# Patient Record
Sex: FEMALE | Employment: FULL TIME | URBAN - METROPOLITAN AREA
[De-identification: names, ages, dates, MRNs, and addresses within clinical notes are randomized per-mention and may not be internally consistent; named-entity substitution may affect disease eponyms.]

---

## 2020-03-13 ENCOUNTER — HOSPITAL ENCOUNTER (OUTPATIENT)
Dept: PHYSICAL THERAPY | Age: 50
Discharge: HOME | End: 2020-03-13
Payer: COMMERCIAL

## 2020-03-13 PROCEDURE — 97110 THERAPEUTIC EXERCISES: CPT

## 2020-03-13 PROCEDURE — 97161 PT EVAL LOW COMPLEX 20 MIN: CPT

## 2020-03-13 NOTE — PROGRESS NOTES
[facility] PHYSICAL THERAPY  [address] [place] [address] 51, Anup Spence [address],[facility], [facility] - Phone: [phone]  Fax: [phone] / 7709 [facility]  Patient Name: Gideon Brown : 1970   Medical   Diagnosis: Left knee pain [M25.562] Treatment Diagnosis: Left knee pain [M25.562]   Onset Date: DOS 3/9/2020     Referral Source: Raheem Beckett MD Start of [facility]): 3/13/2020   Prior Hospitalization: See medical history Provider #: [id number]   Prior Level of Function: I with ADL's, no pain with standing walking stairs   Comorbidities: None Listed   Medications: Verified on Patient Summary List   The Plan of Care and following information is based on the information from the initial evaluation.   ==================================================================================  Assessment / key information:  Pt is 52year old female s/p L ACL reconstruction on 3/9/2020. Pt reports sustaining a fall in December causing injury to the L knee. Pt presented today with L knee brace locked at 0 and use of BL axillary crutches. Pt was PWB with good form and setting of the BL axillary crutches. Pt has 2 anterior scope incision with sutures, an anterior knee incision approximately 2 inches covered with steristrips, and a lateral upper leg incision also covered with a steristrip. Incisions appear well healing and no signs of infection. Pt was educated on s/s of infections and verbalized understanding. Pt reports use of CPM machine for 6 hours a day at home. Pt reports compliance with given exercises from MD as well as use of brace at all times, including sleeping. Pt was educated on post-op protocol and precautions. PROM of the L knee 11-90 degrees. Moderate edema of the L lower leg into the dorsum of the L foot. Moderate edema of the L knee and quad. Decreased quad contraction of the L LE.  Special tests and strength not tested. Pt was educated on proper gait pattern with BL axillary crutches as well as all use of CP for reductions in pain and swelling. The patient was instructed in a home exercise program to address the above findings/deficits. Pt will benefit from PT interventions to address the aforementioned deficits and allow pt to return to PLOF.    ==================================================================================  Eval Complexity: History LOW Complexity : Zero comorbidities / personal factors that will impact the outcome / POC;  Examination  HIGH Complexity : 4+ Standardized tests and measures addressing body structure, function, activity limitation and / or participation in recreation ; Presentation MEDIUM Complexity : Evolving with changing characteristics ; Decision Making Other outcome measures clinical judgement  MEDIUM; Overall Complexity LOW   Problem List: pain affecting function, decrease ROM, decrease strength, edema affecting function, impaired gait/ balance, decrease ADL/ functional abilitiies, decrease activity tolerance and decrease flexibility/ joint mobility   Treatment Plan may include any combination of the following: Therapeutic exercise, Therapeutic activities, Neuromuscular re-education, Physical agent/modality, Gait/balance training, Manual therapy, Patient education, Self Care training, Functional mobility training and Stair training  Patient / Family readiness to learn indicated by: asking questions, trying to perform skills and interest  Persons(s) to be included in education: patient (P)   Barriers to Learning/Limitations: None  Measures taken, if barriers to learning:    Patient Goal (s): Improve knee pain, return to PLOF   Patient self reported health status: good  Rehabilitation Potential: good   Short Term Goals: To be accomplished in  6  treatments:  1. Pt to demonstrate independence with HEP to improve L knee AROM and quad strength for ADLs.   2. Pt to demonstrate 0 degrees L knee extension AROM to improve stability with amb. 3. Pt to demonstrate 10 degree or > improvement in L knee flexion AROM to improve mobility for ADLs.  Long Term Goals: To be accomplished in  12  treatments:  1. Pt to demonstrate 10 reps SLR without lag to improve stability with amb. 2. Pt to demonstrate 0 to 130 degrees L knee flexion AROM to improve mobility for ADLs. 3. Pt to report +5 or > on GROC to improve functional mobility for ADLs. 4. Patient will be independent with long term HEP in order to prepare for DC to home. Frequency / Duration:   Patient to be seen  2  times per week for 12  treatments:  Patient / Caregiver education and instruction: exercises  G-Codes (GP): ANDREINA  Therapist Signature: Margaret Strauss PT, DPT   Date: 0/00/0111   Certification Period: NA Time: 7:40 AM   ==================================================================================  I certify that the above Physical Therapy Services are being furnished while the patient is under my care. I agree with the treatment plan and certify that this therapy is necessary. Physician Signature:        Date:       Time:     Please sign and return to [facility] at [facility] [place], [facility]. or you may fax the signed copy to [phone]. Thank you.

## 2020-03-13 NOTE — PROGRESS NOTES
PHYSICAL THERAPY - DAILY TREATMENT NOTE    Patient Name: Edel Brennan        Date: 3/13/2020  : 1970   yes Patient  Verified  Visit #:      12  Insurance: Payor: Ariela Hernandez / Plan: Joseph Henry PPO / Product Type: PPO /      In time: 1000 Out time: 6844   Total Treatment Time: 45     Medicare/BCBS Time Tracking (below)   Total Timed Codes (min):  na 1:1 Treatment Time:  na     TREATMENT AREA =  Left knee pain [M25.562]    SUBJECTIVE  Pain Level (on 0 to 10 scale):  0  / 10   Medication Changes/New allergies or changes in medical history, any new surgeries or procedures?    no  If yes, update Summary List   Subjective Functional Status/Changes:  []  No changes reported     SEE POC         OBJECTIVE    35 min Evaluation     10 min Therapeutic Exercise:  [x]  See flow sheet   Rationale:      increase ROM and increase strength to improve the patients ability to perform functional ADL's     Billed With/As:   [] TE   [] TA   [] Neuro   [] Self Care Patient Education: [x] Review HEP    [] Progressed/Changed HEP based on:   [] positioning   [] body mechanics   [] transfers   [] heat/ice application    [] other:      Other Objective/Functional Measures:    SEE POC     Post Treatment Pain Level (on 0 to 10) scale:   0  / 10     ASSESSMENT  Assessment/Changes in Function:     Evaluation Code Complexity: History LOW Complexity : Zero comorbidities / personal factors that will impact the outcome / POC; Examination HIGH Complexity : 4+ Standardized tests and measures addressing body structure, function, activity limitation and / or participation in recreation ; Presentation MEDIUM Complexity : Evolving with changing characteristics ; Decision Making Other outcome measures clinical judgement  MEDIUM;  Complexity LOW     Justification for Eval Code Complexity:  Patient History : none  Examination SEE ABOVE EXAM  Clinical Presentation: eovlving pain  Clinical Decision Making : clinical judgement           []  See Progress Note/Recertification   Patient will continue to benefit from skilled PT services to modify and progress therapeutic interventions, address functional mobility deficits, address ROM deficits, address strength deficits, analyze and address soft tissue restrictions, analyze and cue movement patterns, analyze and modify body mechanics/ergonomics and assess and modify postural abnormalities to attain remaining goals. Progress toward goals / Updated goals:    SEE POC     PLAN  []  Upgrade activities as tolerated yes Continue plan of care   []  Discharge due to :    [x]  Other: Pt will be seen 2 times per week for 12 sessions.       Therapist: Lilly Palm, PT, DPT    Date: 3/13/2020 Time: 7:40 AM     Future Appointments   Date Time Provider Olga Lidia Marie   3/13/2020 10:00 AM Vibha Schaffer Cea, PT [facility]

## 2020-03-17 ENCOUNTER — HOSPITAL ENCOUNTER (OUTPATIENT)
Dept: PHYSICAL THERAPY | Age: 50
Discharge: HOME | End: 2020-03-17
Payer: COMMERCIAL

## 2020-03-17 PROCEDURE — 97110 THERAPEUTIC EXERCISES: CPT | Performed by: PHYSICAL THERAPIST

## 2020-03-17 PROCEDURE — 97140 MANUAL THERAPY 1/> REGIONS: CPT | Performed by: PHYSICAL THERAPIST

## 2020-03-17 NOTE — PROGRESS NOTES
Rina Irizarry   PHYSICAL THERAPY - DAILY TREATMENT NOTE    Patient Name: Carlos Manuel Metcalf        Date: 3/17/2020  : 1970   yes Patient  Verified  Visit #:     Insurance: Payor: Kelley Anderson / Plan: [place] [employer] PPO / Product Type: PPO /      In time: 1030 Out time: 1120   Total Treatment Time: 50     Medicare/BCBS Time Tracking (below)   Total Timed Codes (min):  na 1:1 Treatment Time:  na     TREATMENT AREA =  Left knee pain [M25.562]    SUBJECTIVE  Pain Level (on 0 to 10 scale):  3  / 10   Medication Changes/New allergies or changes in medical history, any new surgeries or procedures?    no  If yes, update Summary List   Subjective Functional Status/Changes:  []  No changes reported     Every time I stand up with my brace it falls down - I think because my swelling was so bad it fit better and now that it has gone done it wont fit          OBJECTIVE      25 min Therapeutic Exercise:  [x]  See flow sheet   Rationale:      increase ROM and increase strength to improve the patients ability to complete adls     25 min Manual Therapy: Brace adjustment, stm gastroc, quad, pat sup/inf mobs, knee prom per protocol   Rationale:      decrease pain, increase ROM, increase tissue extensibility, decrease edema  and decrease trigger points to improve patient's ability to complete adls       Billed With/As:   [] TE   [] TA   [] Neuro   [] Self Care Patient Education: [x] Review HEP    [] Progressed/Changed HEP based on:   [] positioning   [] body mechanics   [] transfers   [] heat/ice application    [] other:      Other Objective/Functional Measures:    Pt arrived to session wbat in OTC brace vs Inis Siskin brace locked at 0 due to improper fitting, even with straps pulled to end still unable to wear with hinges at appropriate angle, pt was advised to contact MD office and inform for upcoming appointment to have brace rep present to adjust, pt was advise to wear thicker clothing and if unable to wear at appropriate length NWB - demo verbal understanding   Noted edema along medial incision   te as per flow sheet  Unable to perform estim due to pt clothing - will attempt next session      Post Treatment Pain Level (on 0 to 10) scale:   2  / 10     ASSESSMENT  Assessment/Changes in Function:     No increase in pain following initial session      []  See Progress Note/Recertification   Patient will continue to benefit from skilled PT services to modify and progress therapeutic interventions, address functional mobility deficits, address ROM deficits, address strength deficits, analyze and address soft tissue restrictions, analyze and cue movement patterns, analyze and modify body mechanics/ergonomics, assess and modify postural abnormalities and instruct in home and community integration to attain remaining goals.    Progress toward goals / Updated goals:    Compliant with hep     PLAN  []  Upgrade activities as tolerated yes Continue plan of care   []  Discharge due to :    []  Other:      Therapist: Samia Aguilar, PT    Date: 3/17/2020 Time: 2:16 PM     Future Appointments   Date Time Provider Olga Lidia Marie   [facility]2020 [facility]   3/24/2020  4:30 PM Tiny Noel [facility]   3/26/2020  3:00 PM Jaime Landa [facility]   3/31/2020  4:30 PM Tiny Noel [facility]   4/2/2020  4:30 PM Tiny Noel [facility]   4/7/2020  5:00 PM Bailey Owens PT [facility]   4/9/2020  5:00 PM Damaris Martin [facility]   4/14/2020  4:30 PM Damaris Martin [facility]   4/16/2020  4:30 PM Damaris Martin [facility]   4/21/2020  4:30 PM Damaris Martin [facility]   4/23/2020  4:30 PM Michael Keene, PT [facility]

## 2020-03-19 ENCOUNTER — HOSPITAL ENCOUNTER (OUTPATIENT)
Dept: PHYSICAL THERAPY | Age: 50
Discharge: HOME | End: 2020-03-19
Payer: COMMERCIAL

## 2020-03-19 PROCEDURE — 97110 THERAPEUTIC EXERCISES: CPT

## 2020-03-19 PROCEDURE — 97014 ELECTRIC STIMULATION THERAPY: CPT

## 2020-03-19 PROCEDURE — 97140 MANUAL THERAPY 1/> REGIONS: CPT

## 2020-03-19 NOTE — PROGRESS NOTES
[facility] PHYSICAL THERAPY - DAILY TREATMENT NOTE    Patient Name: Micheal Po        Date: 3/19/2020  : 1970   yes Patient  Verified  Visit #:   3   of   12  Insurance: Payor: Ameya Zarco / Plan: Elías Gonzalez PPO / Product Type: PPO /      In time: 11:30 Out time: 12:53   Total Treatment Time: 83     Medicare/[facility] Time Tracking (below)   Total Timed Codes (min):  na 1:1 Treatment Time:  na     TREATMENT AREA =  Left knee pain [M25.562]    SUBJECTIVE  Pain Level (on 0 to 10 scale):  2  / 10   Medication Changes/New allergies or changes in medical history, any new surgeries or procedures?    no  If yes, update Summary List   Subjective Functional Status/Changes:  []  No changes reported     Patient reports not calling MD office about brace fitting, however has been using 2 ace wraps to keep it from falling down her leg. OBJECTIVE    Modalities Rationale:     decrease inflammation, decrease pain and increase muscle contraction/control to improve patient's ability to complete transfers  10 Min [x] Estim, type/location: [facility] to L quad; 10/50                                     []  att     [x]  unatt     []  w/US     [x]  w/ice    []  w/heat    min []  Mechanical Traction: type/lbs                   []  pro   []  sup   []  int   []  cont    []  before manual    []  after manual    min []  Ultrasound, settings/location:      min []  Iontophoresis w/ dexamethasone, location:                                               []  take home patch       []  in clinic    min []  Ice     []  Heat    location/position:     min []  Vasopneumatic Device, press/temp:     min []  Other:    [x] Skin assessment post-treatment (if applicable):    [x]  intact    [x]  redness- no adverse reaction     []redness  adverse reaction:       48 min Therapeutic Exercise:  [x]  See flow sheet   Rationale:      increase ROM and increase strength to improve the patients ability to perform functional ADLs.      25 min Manual Therapy: STM to L gastroc, L quad, L pat sup/inf mobs, L knee PROM per protocol   Rationale:      decrease pain, increase ROM, increase tissue extensibility, decrease edema  and decrease trigger points to improve patient's ability to regain L knee PROM>AAROM>AROM. Billed With/As:   [x] TE   [] TA   [] Neuro   [] Self Care Patient Education: [x] Review HEP    [] Progressed/Changed HEP based on:   [] positioning   [] body mechanics   [] transfers   [] heat/ice application    [] other:      Other Objective/Functional Measures:    Continued to hold weightbearing activities due to poor brace fit. Highly advised pt to contact MD office and inform of brace fit. Patient verbalized understanding. Continued to be NWB with all therex. Post Treatment Pain Level (on 0 to 10) scale:   3  / 10     ASSESSMENT  Assessment/Changes in Function:     Patient noted minimal elevation in pain following PT session, noting more soreness vs sharp pain. []  See Progress Note/Recertification   Patient will continue to benefit from skilled PT services to modify and progress therapeutic interventions, address functional mobility deficits, address ROM deficits, address strength deficits, analyze and address soft tissue restrictions, analyze and cue movement patterns, analyze and modify body mechanics/ergonomics, assess and modify postural abnormalities and instruct in home and community integration to attain remaining goals.    Progress toward goals / Updated goals:    Slow progress towards PROM goals     PLAN  [x]  Upgrade activities as tolerated yes Continue plan of care   []  Discharge due to :    []  Other:      Therapist: JED Roberts    Date: 3/19/2020 Time: 1:59 PM     Future Appointments   Date Time Provider Olga Lidia Marie   3/19/2020 11:30 AM Jamil Yarbrough [facility]   3/24/2020  4:30 PM Ashley Noel [facility]   3/26/2020  3:00 PM Letty Peterson [facility]   3/31/2020  4:30 PM Jamil Yarbrough [facility]   4/2/2020 4:30 PM Hetal Rudolph [facility]   4/7/2020  5:00 PM Katie Rodriguez, PT [facility]   4/9/2020  5:00 PM Elex Comment [facility]   4/14/2020  4:30 PM Elex Comment [facility]   4/16/2020  4:30 PM Elex Comment [facility]   4/21/2020  4:30 PM Elex Comment [facility]   4/23/2020  4:30 PM Jens Keene, PT [facility]

## 2020-03-24 ENCOUNTER — APPOINTMENT (OUTPATIENT)
Dept: PHYSICAL THERAPY | Age: 50
End: 2020-03-24
Payer: COMMERCIAL

## 2020-03-26 ENCOUNTER — APPOINTMENT (OUTPATIENT)
Dept: PHYSICAL THERAPY | Age: 50
End: 2020-03-26
Payer: COMMERCIAL

## 2020-03-31 ENCOUNTER — APPOINTMENT (OUTPATIENT)
Dept: PHYSICAL THERAPY | Age: 50
End: 2020-03-31
Payer: COMMERCIAL

## 2020-04-02 ENCOUNTER — APPOINTMENT (OUTPATIENT)
Dept: PHYSICAL THERAPY | Age: 50
End: 2020-04-02
Payer: COMMERCIAL

## 2020-04-07 ENCOUNTER — APPOINTMENT (OUTPATIENT)
Dept: PHYSICAL THERAPY | Age: 50
End: 2020-04-07
Payer: COMMERCIAL

## 2020-04-09 ENCOUNTER — APPOINTMENT (OUTPATIENT)
Dept: PHYSICAL THERAPY | Age: 50
End: 2020-04-09
Payer: COMMERCIAL

## 2020-04-14 ENCOUNTER — APPOINTMENT (OUTPATIENT)
Dept: PHYSICAL THERAPY | Age: 50
End: 2020-04-14
Payer: COMMERCIAL

## 2020-04-16 ENCOUNTER — APPOINTMENT (OUTPATIENT)
Dept: PHYSICAL THERAPY | Age: 50
End: 2020-04-16
Payer: COMMERCIAL

## 2020-04-21 ENCOUNTER — APPOINTMENT (OUTPATIENT)
Dept: PHYSICAL THERAPY | Age: 50
End: 2020-04-21
Payer: COMMERCIAL

## 2020-04-23 ENCOUNTER — APPOINTMENT (OUTPATIENT)
Dept: PHYSICAL THERAPY | Age: 50
End: 2020-04-23
Payer: COMMERCIAL

## 2020-04-27 ENCOUNTER — HOSPITAL ENCOUNTER (OUTPATIENT)
Dept: PHYSICAL THERAPY | Age: 50
Discharge: HOME | End: 2020-04-27
Payer: COMMERCIAL

## 2020-04-27 PROCEDURE — 97110 THERAPEUTIC EXERCISES: CPT

## 2020-04-27 PROCEDURE — 97140 MANUAL THERAPY 1/> REGIONS: CPT

## 2020-04-27 NOTE — PROGRESS NOTES
[facility] PHYSICAL THERAPY - DAILY TREATMENT NOTE    Patient Name: Ricardo Walter        Date: 2020  : 1970   yes Patient  Verified  Visit #:     Insurance: Payor: Eric Bonner / Plan: [place] [employer] PPO / Product Type: PPO /      In time: 12:00 Out time: 1:09   Total Treatment Time: 69     Medicare/BCBS Time Tracking (below)   Total Timed Codes (min):  na 1:1 Treatment Time:  na     TREATMENT AREA =  Left knee pain [M25.562]    SUBJECTIVE  Pain Level (on 0 to 10 scale): 0  / 10   Medication Changes/New allergies or changes in medical history, any new surgeries or procedures?    no  If yes, update Summary List   Subjective Functional Status/Changes:  []  No changes reported     SEE PN       OBJECTIVE    Modalities Rationale:     decrease inflammation, decrease pain and increase muscle contraction/control to improve patient's ability to complete transfers  10 Min [x] Estim, type/location: [place] to L quad; 10/30; 60 bps                                    []  att     [x]  unatt     []  w/US     [x]  w/ice    []  w/heat    min []  Mechanical Traction: type/lbs                   []  pro   []  sup   []  int   []  cont    []  before manual    []  after manual    min []  Ultrasound, settings/location:      min []  Iontophoresis w/ dexamethasone, location:                                               []  take home patch       []  in clinic    min []  Ice     []  Heat    location/position:     min []  Vasopneumatic Device, press/temp:     min []  Other:    [x] Skin assessment post-treatment (if applicable):    [x]  intact    [x]  redness- no adverse reaction     []redness - adverse reaction:       39 min Therapeutic Exercise:  [x]  See flow sheet   Rationale:      increase ROM and increase strength to improve the patients ability to perform functional ADLs.      20 min Manual Therapy: L knee assessment; STM to L gastroc, L quad, L pat sup/inf mobs, L knee PROM per protocol   Rationale:      decrease pain, increase ROM, increase tissue extensibility, decrease edema  and decrease trigger points to improve patient's ability to regain L knee PROM>AAROM>AROM. Billed With/As:   [x] TE   [] TA   [] Neuro   [] Self Care Patient Education: [x] Review HEP    [] Progressed/Changed HEP based on:   [] positioning   [] body mechanics   [] transfers   [] heat/ice application    [] other:      Other Objective/Functional Measures:    SEE PN     Post Treatment Pain Level (on 0 to 10) scale:   2  / 10     ASSESSMENT  Assessment/Changes in Function:     SEE PN     []  See Progress Note/Recertification   Patient will continue to benefit from skilled PT services to modify and progress therapeutic interventions, address functional mobility deficits, address ROM deficits, address strength deficits, analyze and address soft tissue restrictions, analyze and cue movement patterns, analyze and modify body mechanics/ergonomics, assess and modify postural abnormalities and instruct in home and community integration to attain remaining goals.    Progress toward goals / Updated goals:    SEE PN     PLAN  [x]  Upgrade activities as tolerated yes Continue plan of care   []  Discharge due to :    []  Other:      Therapist: JED Gentile    Date: 4/27/2020 Time: 1:14 PM     Future Appointments   Date Time Provider Olga Lidia Marie   4/27/2020 12:00 PM Melchor, Myna [facility]   4/29/2020 12:00 PM Jenn [facility]   5/4/2020 11:00 AM Melchor, Myna [facility]   5/6/2020 11:00 AM Daniela [facility]

## 2020-04-27 NOTE — PROGRESS NOTES
[facility] PHYSICAL THERAPY  [address] [place] [address] Anjelica Madden [facility] [address],[facility], 70 [facility] - Phone: [phone]  Fax: [phone]  PROGRESS NOTE  Patient Name: Nickolas Smallwood : 1970   Treatment/Medical Diagnosis: Left knee pain [M25.562]   Referral Source: Janelle Birmingham MD     Date of Initial Visit: 20 Attended Visits: 4 Missed Visits: 0     SUMMARY OF TREATMENT  Patient has attended 4 PT sessions, including an initial eval, for L knee pain (s/p ACLR 20). Gap in attendance due to nationwide concerns of COVID-19. PT interventions have included manual therapy, therapeutic exercises, patient education, and HEP. [place] electric stimulation with CP to increase muscle contraction/control. CURRENT STATUS  Patient is currently 7 weeks post op. In the last 2 weeks, pain has ranged between 0-2/10, primarily along medial scar. Brace locked at 0 deg. Current objective findings: R knee P/AROM flex 101 deg/95 deg, ext -3 deg/-5 deg; SLR 10 reps with min extensor lag; decrease patellar mobility in all directions, good healing along incisions    Goal/Measure of Progress Goal Met? 1.  Pt to demonstrate independence with HEP to improve L knee AROM and quad strength for ADL   Status at last Eval: n/a Current Status: HEP established progressing   2. Pt to demonstrate 0 degrees L knee extension AROM to improve stability with amb. Status at last Eval: -11 Current Status: -5 progresisng   3. Pt to demonstrate 10 degree or > improvement in L knee flexion AROM to improve mobility for ADLs. Status at last Eval: NT Current Status: 101 progressing     New Goals to be achieved in __8-12__  treatments:  1. Continue with LTG #1  2. Continue with LTG #2  3.  Continue with LTG #3    RECOMMENDATIONS  Patient would continue to benefit from skilled PT interventions for 2-3x/wk for 4 weeks to improve L knee ROM, flexibility, and strength within MD protocol to perform functional mobility activities. If you have any questions/comments please contact us directly at [phone]. Thank you for allowing us to assist in the care of your patient. Therapist Signature: JED Parkinson Date: 4/27/2020     Time: 1:11 PM   NOTE TO PHYSICIAN:  [address] AND FAX TO   [facility]: [phone]  If you are unable to process this request in 24 hours please contact our office: [phone]    ___ I have read the above report and request that my patient continue as recommended.   ___ I have read the above report and request that my patient continue therapy with the following changes/special instructions:_________________________________________________________   ___ I have read the above report and request that my patient be discharged from therapy.      Physician Signature:        Date:       Time:

## 2020-04-29 ENCOUNTER — HOSPITAL ENCOUNTER (OUTPATIENT)
Dept: PHYSICAL THERAPY | Age: 50
Discharge: HOME | End: 2020-04-29
Payer: COMMERCIAL

## 2020-04-29 PROCEDURE — 97110 THERAPEUTIC EXERCISES: CPT

## 2020-04-29 PROCEDURE — 97140 MANUAL THERAPY 1/> REGIONS: CPT

## 2020-04-29 PROCEDURE — 97014 ELECTRIC STIMULATION THERAPY: CPT

## 2020-04-29 NOTE — PROGRESS NOTES
[facility] PHYSICAL THERAP[facility] - DAILY TREATMENT NOTE    Patient Name: Alberto Poplar        Date: 2020  : 1970   yes Patient  Verified  Visit #:   5     Insurance: Payor: Vena Duane / Plan: [place] [employer] / Product Type: PPO /      In time: 11:58 Out time: 1:02   Total Treatment Time: 64     Medicare/[facility] Time Tracking (below)   Total Timed Codes (min):  na 1:1 Treatment Time:  na     TREATMENT AREA =  Left knee pain [M25.562]    SUBJECTIVE  Pain Level (on 0 to 10 scale): 0  / 10   Medication Changes/New allergies or changes in medical history, any new surgeries or procedures?    no  If yes, update Summary List   Subjective Functional Status/Changes:  []  No changes reported     Patient reports no pain, however has some \"pulling\" and \"discomfort\" along the medial joint line. Also reports doing her updated HEP. OBJECTIVE    Modalities Rationale:     decrease inflammation, decrease pain and increase muscle contraction/control to improve patient's ability to complete transfers  10 Min [x] Estim, type/location: [place] to L RF and VMO; 10/30; 65 bps                                    []  att     [x]  unatt     []  w/US     [x]  w/ice    []  w/heat    min []  Mechanical Traction: type/lbs                   []  pro   []  sup   []  int   []  cont    []  before manual    []  after manual    min []  Ultrasound, settings/location:      min []  Iontophoresis w/ dexamethasone, location:                                               []  take home patch       []  in clinic    min []  Ice     []  Heat    location/position:     min []  Vasopneumatic Device, press/temp:     min []  Other:    [x] Skin assessment post-treatment (if applicable):    [x]  intact    [x]  redness- no adverse reaction     []redness - adverse reaction:       34 min Therapeutic Exercise:  [x]  See flow sheet   Rationale:      increase ROM and increase strength to improve the patients ability to perform functional ADLs.      20 min Manual Therapy: IASTM/DTM to L gastroc, L quad, and L medial hamstrings; L pat sup/inf mobs, L knee PROM per protocol   Rationale:      decrease pain, increase ROM, increase tissue extensibility, decrease edema  and decrease trigger points to improve patient's ability to regain L knee PROM>AAROM>AROM. Billed With/As:   [x] TE   [] TA   [] Neuro   [] Self Care Patient Education: [x] Review HEP    [] Progressed/Changed HEP based on:   [] positioning   [] body mechanics   [] transfers   [] heat/ice application    [] other:      Other Objective/Functional Measures:    Brace unlocked 0-60 deg per MD protocol. Educated and reviewed use of bilateral crutches/WBAT status with brace unlocked. Educated pt on proper gait mechanics (heel toe gait pattern)  Added prone hang and longsit HS stretch to improve L knee extension. Patient very ticklish during manual techniques, however demo improved tolerance with IASTM tool vs skin to skin. L knee PROM following MT: 1 -110 deg  Patient observed with moderate knee valgus during AAROM exercises. Cues to reduce valgus to prevent \"pulling\" or \"discomfort\" along medial joint line. Post Treatment Pain Level (on 0 to 10) scale:   0  / 10     ASSESSMENT  Assessment/Changes in Function:     Patient making slow but steady progress with improving L knee ROM. Note elevation in pain following PT session. []  See Progress Note/Recertification   Patient will continue to benefit from skilled PT services to modify and progress therapeutic interventions, address functional mobility deficits, address ROM deficits, address strength deficits, analyze and address soft tissue restrictions, analyze and cue movement patterns, analyze and modify body mechanics/ergonomics, assess and modify postural abnormalities and instruct in home and community integration to attain remaining goals.    Progress toward goals / Updated goals:    Progressing towards LTGs #2 and #3     PLAN  [x]  Upgrade activities as tolerated yes Continue plan of care   []  Discharge due to :    []  Other:      Therapist: JED Membreno    Date: 4/29/2020 Time: 1:07 PM     Future Appointments   Date Time Provider Olga Lidia Marie   4/29/2020 12:00 PM [facility]   4/30/2020  9:00 AM Bert [facility]   [facility]   [facility]   5/7/2020  9:00 AM Bert [facility]

## 2020-04-30 ENCOUNTER — HOSPITAL ENCOUNTER (OUTPATIENT)
Dept: PHYSICAL THERAPY | Age: 50
Discharge: HOME | End: 2020-04-30
Payer: COMMERCIAL

## 2020-04-30 PROCEDURE — 97140 MANUAL THERAPY 1/> REGIONS: CPT

## 2020-04-30 PROCEDURE — 97110 THERAPEUTIC EXERCISES: CPT

## 2020-04-30 PROCEDURE — 97014 ELECTRIC STIMULATION THERAPY: CPT

## 2020-04-30 NOTE — PROGRESS NOTES
[facility] PHYSICAL THERAPY - DAILY TREATMENT NOTE    Patient Name: Gina Patel        Date: 2020  : 1970   yes Patient  Verified  Visit #:     Insurance: Payor: Shayla Del Real / Plan: [place] [employer] / Product Type: PPO /      In time: 8:58 Out time: 10:04   Total Treatment Time: 66     Medicare/[facility] Time Tracking (below)   Total Timed Codes (min):  na 1:1 Treatment Time:  na     TREATMENT AREA =  Left knee pain [M25.562]    SUBJECTIVE  Pain Level (on 0 to 10 scale): 0 / 10   Medication Changes/New allergies or changes in medical history, any new surgeries or procedures?    no  If yes, update Summary List   Subjective Functional Status/Changes:  []  No changes reported     Patient reports going for a walk yesterday evening with her son. States she was wearing her brace and used the crutches. OBJECTIVE    Modalities Rationale:     decrease inflammation, decrease pain and increase muscle contraction/control to improve patient's ability to complete transfers  10 Min [x] Estim, type/location: [place] to L RF and VMO; 10/30; 65 bps                                    []  att     [x]  unatt     []  w/US     [x]  w/ice    []  w/heat    min []  Mechanical Traction: type/lbs                   []  pro   []  sup   []  int   []  cont    []  before manual    []  after manual    min []  Ultrasound, settings/location:      min []  Iontophoresis w/ dexamethasone, location:                                               []  take home patch       []  in clinic    min []  Ice     []  Heat    location/position:     min []  Vasopneumatic Device, press/temp:     min []  Other:    [x] Skin assessment post-treatment (if applicable):    [x]  intact    [x]  redness- no adverse reaction     []redness - adverse reaction:       36 min Therapeutic Exercise:  [x]  See flow sheet   Rationale:      increase ROM and increase strength to improve the patients ability to perform functional ADLs.      20 min Manual Therapy: IASTM/DTM to L gastroc, L quad, and L medial hamstrings; L pat sup/inf mobs, L knee PROM per protocol   Rationale:      decrease pain, increase ROM, increase tissue extensibility, decrease edema  and decrease trigger points to improve patient's ability to regain L knee PROM>AAROM>AROM. Billed With/As:   [x] TE   [] TA   [] Neuro   [] Self Care Patient Education: [x] Review HEP    [] Progressed/Changed HEP based on:   [] positioning   [] body mechanics   [] transfers   [] heat/ice application    [] other:      Other Objective/Functional Measures:    Initiated session with MT to increase extension followed with extension-based exercises. Performed recumbent bike followed by MT to improve flex. L knee AROM ext pre MT: -5 deg. L knee AROM ext post MT: -3 deg    Demonstrated min extensor lag with SLR. Post Treatment Pain Level (on 0 to 10) scale:   2 / 10     ASSESSMENT  Assessment/Changes in Function:     Patient noted elevation in pain level following PT session (noting more soreness vs sharp pain/red flags), however educated pt this was a normal response due to attending f/u visits 2 days in a row. []  See Progress Note/Recertification   Patient will continue to benefit from skilled PT services to modify and progress therapeutic interventions, address functional mobility deficits, address ROM deficits, address strength deficits, analyze and address soft tissue restrictions, analyze and cue movement patterns, analyze and modify body mechanics/ergonomics, assess and modify postural abnormalities and instruct in home and community integration to attain remaining goals. Progress toward goals / Updated goals:    New Goals to be achieved in __8-12__  treatments:  1. Pt to demonstrate independence with HEP to improve L knee AROM and quad strength for ADLs. Met 04/30  2. Pt to demonstrate 0 degrees L knee extension AROM to improve stability with amb. Progressing 04/30; -3 deg  3.  Pt to demonstrate 10 degree or > improvement in L knee flexion AROM to improve mobility for ADLs.      PLAN  [x]  Upgrade activities as tolerated yes Continue plan of care   []  Discharge due to :    []  Other:      Therapist: JED Yang    Date: 4/30/2020 Time: 10:19 AM     Future Appointments   Date Time Provider Olga Lidia Marie   4/30/2020  9:00 AM Genesis Noel [facility]   5/4/2020 11:00 AM Onur Noel [facility]   5/6/2020 11:00 AM Onur Noel [facility]   5/7/2020  1:00 PM [place] [facility]

## 2020-05-04 ENCOUNTER — HOSPITAL ENCOUNTER (OUTPATIENT)
Dept: PHYSICAL THERAPY | Age: 50
Discharge: HOME | End: 2020-05-04
Payer: COMMERCIAL

## 2020-05-04 PROCEDURE — 97014 ELECTRIC STIMULATION THERAPY: CPT

## 2020-05-04 PROCEDURE — 97110 THERAPEUTIC EXERCISES: CPT

## 2020-05-04 PROCEDURE — 97140 MANUAL THERAPY 1/> REGIONS: CPT

## 2020-05-04 NOTE — PROGRESS NOTES
[facility] PHYSICAL THERAPY - DAILY TREATMENT NOTE    Patient Name: Libra Prasad        Date: 2020  : 1970   yes Patient  Verified  Visit #:     Insurance: Payor: [facility] / Plan: [place] [employer] PPO / Product Type: PPO /      In time: 10:54 Out time: 11:59   Total Treatment Time: 65     Medicare/[facility] Time Tracking (below)   Total Timed Codes (min):  na 1:1 Treatment Time:  na     TREATMENT AREA =  Left knee pain [M25.562]    SUBJECTIVE  Pain Level (on 0 to 10 scale): 1-2 / 10   Medication Changes/New allergies or changes in medical history, any new surgeries or procedures?    no  If yes, update Summary List   Subjective Functional Status/Changes:  []  No changes reported     Pt reports have some pain along the medial joint line. Noticed bruises from manual techniques, stating \"I bruise easily\". Also reports performing quad activation throughout her day as well as performing heel slides when she's in bed. MD F/U appt on  at 1030.         OBJECTIVE    Modalities Rationale:     decrease inflammation, decrease pain and increase muscle contraction/control to improve patient's ability to complete transfers  10 Min [x] Estim, type/location: [place] to L RF and VMO; 10/30; 65 bps                                    []  att     [x]  unatt     []  w/US     [x]  w/ice    []  w/heat    min []  Mechanical Traction: type/lbs                   []  pro   []  sup   []  int   []  cont    []  before manual    []  after manual    min []  Ultrasound, settings/location:      min []  Iontophoresis w/ dexamethasone, location:                                               []  take home patch       []  in clinic    min []  Ice     []  Heat    location/position:     min []  Vasopneumatic Device, press/temp:     min []  Other:    [x] Skin assessment post-treatment (if applicable):    [x]  intact    [x]  redness- no adverse reaction     []redness - adverse reaction:       40 min Therapeutic Exercise:  [x]  See flow sheet Rationale:      increase ROM and increase strength to improve the patients ability to perform functional ADLs. 15 min Manual Therapy: IASTM/DTM to L gastroc, L quad, and L medial hamstrings; L pat sup/inf mobs, L knee PROM per protocol   Rationale:      decrease pain, increase ROM, increase tissue extensibility, decrease edema  and decrease trigger points to improve patient's ability to regain L knee PROM>AAROM>AROM. Billed With/As:   [x] TE   [] TA   [] Neuro   [] Self Care Patient Education: [x] Review HEP    [] Progressed/Changed HEP based on:   [] positioning   [] body mechanics   [] transfers   [] heat/ice application    [] other:      Other Objective/Functional Measures:    L knee extension P/AROM: 0 deg/-1 deg  L knee flex P/AAROM: 120 deg/115 deg  Presented with light bruising along mid VMO and popliteus. No tenderness noted on bruising. Added SLR adduction to improve LE strength. Practiced gait training with 1 crutch and brace to normalize gait mechanics. Post Treatment Pain Level (on 0 to 10) scale:   0 / 10     ASSESSMENT  Assessment/Changes in Function:     Patient making steady progress with ROM and strength indicated by ability to demo full revolution on recumbent without pain and min to no extensor lag with SLR. []  See Progress Note/Recertification   Patient will continue to benefit from skilled PT services to modify and progress therapeutic interventions, address functional mobility deficits, address ROM deficits, address strength deficits, analyze and address soft tissue restrictions, analyze and cue movement patterns, analyze and modify body mechanics/ergonomics, assess and modify postural abnormalities and instruct in home and community integration to attain remaining goals. Progress toward goals / Updated goals:    New Goals to be achieved in __8-12__  treatments:  1. Pt to demonstrate independence with HEP to improve L knee AROM and quad strength for ADLs.  Met 04/30  2. Pt to demonstrate 0 degrees L knee extension AROM to improve stability with amb. Progressing 05/04; -1 deg  3. Pt to demonstrate 10 degree or > improvement in L knee flexion AROM to improve mobility for ADLs.  Progressing 05/04; AAROM 115 deg     PLAN  [x]  Upgrade activities as tolerated yes Continue plan of care   []  Discharge due to :    []  Other:      Therapist: JED Polk    Date: 5/4/2020 Time: 12:05 PM     Future Appointments   Date Time Provider Olga Lidia Marie   5/4/2020 11:00 AM Noel, Louetta Angle [facility]   5/6/2020 11:00 AM NoelNaomi [facility]   5/7/2020  1:00 PM Daisy [facility]   5/11/2020  9:00 AM Noel, Louetta Angle [facility]   5/13/2020 10:00 AM Noel, Louetta Angle [facility]   5/18/2020  9:00 AM Noel, Louetta Angle [facility]   5/20/2020 10:00 AM Noel, Louetta Angle [facility]   5/27/2020  9:00 AM Noel, Louetta Angle [facility]

## 2020-05-06 ENCOUNTER — HOSPITAL ENCOUNTER (OUTPATIENT)
Dept: PHYSICAL THERAPY | Age: 50
Discharge: HOME | End: 2020-05-06
Payer: COMMERCIAL

## 2020-05-06 PROCEDURE — 97110 THERAPEUTIC EXERCISES: CPT

## 2020-05-06 PROCEDURE — 97014 ELECTRIC STIMULATION THERAPY: CPT

## 2020-05-06 PROCEDURE — 97140 MANUAL THERAPY 1/> REGIONS: CPT

## 2020-05-06 NOTE — PROGRESS NOTES
[facility] PHYSICAL THERAPY - DAILY TREATMENT NOTE    Patient Name: Monty Willis        Date: 2020  : 1970   yes Patient  Verified  Visit #:     Insurance: Payor: Lauren Pickett / Plan: [place] [employer] PPO / Product Type: PPO /      In time: 10:58 Out time: 12:04   Total Treatment Time: 66     Medicare/[facility] Time Tracking (below)   Total Timed Codes (min):  na 1:1 Treatment Time:  na     TREATMENT AREA =  Left knee pain [M25.562]    SUBJECTIVE  Pain Level (on 0 to 10 scale): 0 / 10   Medication Changes/New allergies or changes in medical history, any new surgeries or procedures?    no  If yes, update Summary List   Subjective Functional Status/Changes:  []  No changes reported     Patient currently reports no L knee pain. States that she has been practicing walking        OBJECTIVE    Modalities Rationale:     decrease inflammation, decrease pain and increase muscle contraction/control to improve patient's ability to complete transfers  10 Min [x] Estim, type/location: [place] to L RF and VMO; 10/30; 65 bps                                    []  att     [x]  unatt     []  w/US     [x]  w/ice    []  w/heat    min []  Mechanical Traction: type/lbs                   []  pro   []  sup   []  int   []  cont    []  before manual    []  after manual    min []  Ultrasound, settings/location:      min []  Iontophoresis w/ dexamethasone, location:                                               []  take home patch       []  in clinic    min []  Ice     []  Heat    location/position:     min []  Vasopneumatic Device, press/temp:     min []  Other:    [x] Skin assessment post-treatment (if applicable):    [x]  intact    [x]  redness- no adverse reaction     []redness - adverse reaction:       40 min Therapeutic Exercise:  [x]  See flow sheet   Rationale:      increase ROM and increase strength to improve the patients ability to perform functional ADLs.      16 min Manual Therapy: MET correction/shotgun technique for R ant innominate; IASTM/DTM to L gastroc, L quad, and L medial hamstrings; L pat sup/inf mobs, L knee PROM per protocol   Rationale:      decrease pain, increase ROM, increase tissue extensibility, decrease edema  and decrease trigger points to improve patient's ability to regain L knee PROM>AAROM>AROM. Billed With/As:   [x] TE   [] TA   [] Neuro   [] Self Care Patient Education: [x] Review HEP    [] Progressed/Changed HEP based on:   [] positioning   [] body mechanics   [] transfers   [] heat/ice application    [] other:      Other Objective/Functional Measures:    Decrease VMO activation prior to MT   Added 2 lb weight to mid tib with prone hang to improve extension. Able to achieve full PROM extension, -2 deg AROM ext     Added mini squats (0-45 deg) and lynette negotiation to improve LE strength. Post Treatment Pain Level (on 0 to 10) scale:   2-3 / 10     ASSESSMENT  Assessment/Changes in Function:     Patient demonstrated improved VMO activation following MT, however continues to demo knee valgus and heel whip during amb with 1 crutch. []  See Progress Note/Recertification   Patient will continue to benefit from skilled PT services to modify and progress therapeutic interventions, address functional mobility deficits, address ROM deficits, address strength deficits, analyze and address soft tissue restrictions, analyze and cue movement patterns, analyze and modify body mechanics/ergonomics, assess and modify postural abnormalities and instruct in home and community integration to attain remaining goals. Progress toward goals / Updated goals:    New Goals to be achieved in __8-12__  treatments:  1. Pt to demonstrate independence with HEP to improve L knee AROM and quad strength for ADLs. Met 04/30  2. Pt to demonstrate 0 degrees L knee extension AROM to improve stability with amb. Progressing 05/04; -1 deg  3.  Pt to demonstrate 10 degree or > improvement in L knee flexion AROM to improve mobility for ADLs.  Progressing 05/04; AAROM 115 deg     PLAN  [x]  Upgrade activities as tolerated yes Continue plan of care   []  Discharge due to :    []  Other:      Therapist: JED Reagan    Date: 5/6/2020 Time: 12:55 PM     Future Appointments   Date Time Provider Olga Lidia Marie   5/6/2020 11:00 AM Benson Noel [facility]   5/7/2020  1:00 PM Ayaka [facility]   5/11/2020  9:00 AM Lizett Noel [facility]   5/13/2020 10:00 AM [facility]   [facility]2020  [facility]   5/20/2020  1:00 PM Ottoniel Ramirez [facility]   5/27/2020  9:00 AM Bert [facility]

## 2020-05-07 ENCOUNTER — HOSPITAL ENCOUNTER (OUTPATIENT)
Dept: PHYSICAL THERAPY | Age: 50
Discharge: HOME | End: 2020-05-07
Payer: COMMERCIAL

## 2020-05-07 PROCEDURE — 97140 MANUAL THERAPY 1/> REGIONS: CPT

## 2020-05-07 PROCEDURE — 97014 ELECTRIC STIMULATION THERAPY: CPT

## 2020-05-07 PROCEDURE — 97110 THERAPEUTIC EXERCISES: CPT

## 2020-05-07 NOTE — PROGRESS NOTES
[facility] PHYSICAL THERAPY - DAILY TREATMENT NOTE    Patient Name: Warren Bacon        Date: 2020  : 1970   yes Patient  Verified  Visit #:     Insurance: Payor: Eunice Burgess / Plan: [employer] PPO / Product Type: PPO /      In time: 12:55 Out time: 2:09   Total Treatment Time: 74     Medicare/[facility] Time Tracking (below)   Total Timed Codes (min):  n/a 1:1 Treatment Time: n/a     TREATMENT AREA =  Left knee pain [M25.562]    SUBJECTIVE  Pain Level (on 0 to 10 scale):  0  / 10   Medication Changes/New allergies or changes in medical history, any new surgeries or procedures?    no  If yes, update Summary List   Subjective Functional Status/Changes:  []  No changes reported     Patient denies L knee pain today. OBJECTIVE    Modalities Rationale:     decrease inflammation, decrease pain and increase muscle contraction/control to improve patient's ability to complete transfers  10 Min [x] Estim, type/location: [place] to R RF and VMO; 10/30; 65 bps                                    []  att     [x]  unatt     []  w/US     [x]  w/ice    []  w/heat    min []  Mechanical Traction: type/lbs                   []  pro   []  sup   []  int   []  cont    []  before manual    []  after manual    min []  Ultrasound, settings/location:      min []  Iontophoresis w/ dexamethasone, location:                                               []  take home patch       []  in clinic    min []  Ice     []  Heat    location/position:     min []  Vasopneumatic Device, press/temp:     min []  Other:    [x] Skin assessment post-treatment (if applicable):    [x]  intact    [x]  redness- no adverse reaction     []redness - adverse reaction:       51 min Therapeutic Exercise:  [x]  See flow sheet   Rationale:      increase ROM and increase strength to improve the patients ability to perform functional ADLs.      13 min Manual Therapy: STM/DTM to L gastroc, L quad, and L medial hamstrings; L pat sup/inf mobs, L knee PROM per protocol   Rationale:      decrease pain, increase ROM, increase tissue extensibility, decrease edema  and decrease trigger points to improve patient's ability to regain L knee PROM>AAROM>AROM. Billed With/As:   [x] TE   [] TA   [] Neuro   [] Self Care Patient Education: [x] Review HEP    [] Progressed/Changed HEP based on:   [] positioning   [] body mechanics   [x] transfers   [] heat/ice application    [] other:      Other Objective/Functional Measures:    Demonstrates full revolutions on recumbent bike, albeit slowly   Patient navigates hurdles NR in // bars with ROBLES UE support   Presents with moderate L knee valgus motion with 4\" step ups (L LE leading)   L Knee AROM = 0.5 to 116 deg      Post Treatment Pain Level (on 0 to 10) scale:   1 / 10     ASSESSMENT  Assessment/Changes in Function:     Advanced therex and initiated static standing balance within protocol parameters with good tolerance. []  See Progress Note/Recertification   Patient will continue to benefit from skilled PT services to modify and progress therapeutic interventions, address functional mobility deficits, address ROM deficits, address strength deficits, analyze and address soft tissue restrictions, analyze and cue movement patterns, analyze and modify body mechanics/ergonomics, assess and modify postural abnormalities and instruct in home and community integration to attain remaining goals. Progress toward goals / Updated goals:    New Goals to be achieved in __8-12__  treatments:  1. Pt to demonstrate independence with HEP to improve L knee AROM and quad strength for ADLs. Met 04/30  2. Pt to demonstrate 0 degrees L knee extension AROM to improve stability with amb. Progressing 05/07; L knee extension AROM = -0.5 deg  3. Pt to demonstrate 10 degree or > improvement in L knee flexion AROM to improve mobility for ADLs.  Progressing 05/7; AAROM 116 deg     PLAN  [x]  Upgrade activities as tolerated yes Continue plan of care   [] Discharge due to :    []  Other:      Therapist: Aki Dahl    Date: 5/7/2020 Time: 12:55 PM     Future Appointments   Date Time Provider Olga Lidia Marie   5/7/2020  1:00 PM [facility]   5/11/2020  9:00 AM [facility]   5/13/2020 10:00 AM [facility]   5/18/2020  9:00 AM [facility]   5/20/2020  1:00 PM Lizett Ruiz[facility]   5/27/2020  9:00 AM Nikole [facility]

## 2020-05-11 ENCOUNTER — HOSPITAL ENCOUNTER (OUTPATIENT)
Dept: PHYSICAL THERAPY | Age: 50
Discharge: HOME | End: 2020-05-11
Payer: COMMERCIAL

## 2020-05-11 PROCEDURE — 97140 MANUAL THERAPY 1/> REGIONS: CPT

## 2020-05-11 PROCEDURE — 97014 ELECTRIC STIMULATION THERAPY: CPT

## 2020-05-11 PROCEDURE — 97110 THERAPEUTIC EXERCISES: CPT

## 2020-05-11 NOTE — PROGRESS NOTES
[facility] - DAILY TREATMENT NOTE    Patient Name: Mannie Whitley        Date: 2020  : 1970   yes Patient  Verified  Visit #:   10   of   20-24  Insurance: Payor: Tiarra Oiler / Plan: [place] [employer] / Product Type: PPO /      In time: 8:58 Out time: 10:02   Total Treatment Time: 64     Medicare/BC[employer] Time Tracking (below)   Total Timed Codes (min):  na 1:1 Treatment Time:  na     TREATMENT AREA =  Left knee pain [M25.562]    SUBJECTIVE  Pain Level (on 0 to 10 scale): 0 / 10   Medication Changes/New allergies or changes in medical history, any new surgeries or procedures?    no  If yes, update Summary List   Subjective Functional Status/Changes:  []  No changes reported     Patient reports no pain in the L knee, states that she has been using her rolling pin to help with tight muscles and performing HEP. OBJECTIVE    Modalities Rationale:     decrease inflammation, decrease pain and increase muscle contraction/control to improve patient's ability to complete transfers  10 Min [x] Estim, type/location: [place] to L RF and VMO; 10/30; 65 bps                                    []  att     [x]  unatt     []  w/US     [x]  w/ice    []  w/heat    min []  Mechanical Traction: type/lbs                   []  pro   []  sup   []  int   []  cont    []  before manual    []  after manual    min []  Ultrasound, settings/location:      min []  Iontophoresis w/ dexamethasone, location:                                               []  take home patch       []  in clinic    min []  Ice     []  Heat    location/position:     min []  Vasopneumatic Device, press/temp:     min []  Other:    [x] Skin assessment post-treatment (if applicable):    [x]  intact    [x]  redness- no adverse reaction     []redness - adverse reaction:       39 min Therapeutic Exercise:  [x]  See flow sheet   Rationale:      increase ROM and increase strength to improve the patients ability to perform functional ADLs.      15  min Manual Therapy: MET correction/shotgun technique for R ant innominate; IASTM/DTM to L gastroc, L quad, and L medial hamstrings; L pat sup/inf mobs, L knee PROM per protocol   Rationale:      decrease pain, increase ROM, increase tissue extensibility, decrease edema  and decrease trigger points to improve patient's ability to regain L knee PROM>AAROM>AROM. Billed With/As:   [x] TE   [] TA   [] Neuro   [] Self Care Patient Education: [x] Review HEP    [] Progressed/Changed HEP based on:   [] positioning   [] body mechanics   [] transfers   [] heat/ice application    [] other:      Other Objective/Functional Measures:    No extensor lag with SLR. Fair volition of VMO during quad set prior to MT. Able to negotiate small hurdles with nonrecip gait pattern and SPC. Ambulated around clinic with/without SPC and brace unlocked to normalize gait pattern. R knee AROM post MT: 1 to 116 deg      Post Treatment Pain Level (on 0 to 10) scale:   0 / 10     ASSESSMENT  Assessment/Changes in Function:     Patient progressing well with PT interventions - able to demo no hip hike and good clearance with lynette negotiation and near to normal gait pattern during amb around clinic. []  See Progress Note/Recertification   Patient will continue to benefit from skilled PT services to modify and progress therapeutic interventions, address functional mobility deficits, address ROM deficits, address strength deficits, analyze and address soft tissue restrictions, analyze and cue movement patterns, analyze and modify body mechanics/ergonomics, assess and modify postural abnormalities and instruct in home and community integration to attain remaining goals. Progress toward goals / Updated goals:    New Goals to be achieved in __8-12__  treatments:  1. Pt to demonstrate independence with HEP to improve L knee AROM and quad strength for ADLs. Met 04/30  2. Pt to demonstrate 0 degrees L knee extension AROM to improve stability with amb. Progressing 05/04; -1 deg  3. Pt to demonstrate 10 degree or > improvement in L knee flexion AROM to improve mobility for ADLs.  Progressing 05/11; AAROM 116 deg     PLAN  [x]  Upgrade activities as tolerated yes Continue plan of care   []  Discharge due to :    []  Other:      Therapist: JED Moreno    Date: 5/11/2020 Time: 10:24 AM     Future Appointments   Date Time Provider Olga Lidia Marie   5/11/2020  9:00 AM Shan Osborn [facility]   5/13/2020 10:00 AM Marla Noel [facility]   5/18/2020  9:00 AM Marla Noel [facility]   5/20/2020  1:00 PM Juan Soni, LILLIE [facility]   5/27/2020  9:00 AM Marla Noel [facility]

## 2020-05-13 ENCOUNTER — HOSPITAL ENCOUNTER (OUTPATIENT)
Dept: PHYSICAL THERAPY | Age: 50
Discharge: HOME | End: 2020-05-13
Payer: COMMERCIAL

## 2020-05-13 PROCEDURE — 97110 THERAPEUTIC EXERCISES: CPT

## 2020-05-13 PROCEDURE — 97140 MANUAL THERAPY 1/> REGIONS: CPT

## 2020-05-13 PROCEDURE — 97014 ELECTRIC STIMULATION THERAPY: CPT

## 2020-05-13 NOTE — PROGRESS NOTES
[facility] - DAILY TREATMENT NOTE    Patient Name: Elza Gant        Date: 2020  : 1970   yes Patient  Verified  Visit #:     Insurance: Payor: Benitez Mackey / Plan: [place] [employer] [place] / Product Type: PPO /      In time: 9:45 Out time: 11:00   Total Treatment Time: 75     Medicare/[facility] Time Tracking (below)   Total Timed Codes (min):  na 1:1 Treatment Time:  na     TREATMENT AREA =  Left knee pain [M25.562]    SUBJECTIVE  Pain Level (on 0 to 10 scale): 0 / 10   Medication Changes/New allergies or changes in medical history, any new surgeries or procedures?    no  If yes, update Summary List   Subjective Functional Status/Changes:  []  No changes reported     Patient reports having her  massage her hip muscles and using the tennis ball, notices a difference in walking since the massage. OBJECTIVE    Modalities Rationale:     decrease inflammation, decrease pain and increase muscle contraction/control to improve patient's ability to complete transfers  10 Min [x] Estim, type/location: [place] to L RF and VMO; 10/30; 65 bps                                    []  att     [x]  unatt     []  w/US     [x]  w/ice    []  w/heat    min []  Mechanical Traction: type/lbs                   []  pro   []  sup   []  int   []  cont    []  before manual    []  after manual    min []  Ultrasound, settings/location:      min []  Iontophoresis w/ dexamethasone, location:                                               []  take home patch       []  in clinic    min []  Ice     []  Heat    location/position:     min []  Vasopneumatic Device, press/temp:     min []  Other:    [x] Skin assessment post-treatment (if applicable):    [x]  intact    [x]  redness- no adverse reaction     []redness - adverse reaction:       50 min Therapeutic Exercise:  [x]  See flow sheet   Rationale:      increase ROM and increase strength to improve the patients ability to perform functional ADLs.      15 min Manual Therapy: IASTM/DTM to L gastroc, L quad, and L medial hamstrings; L pat sup/inf mobs, L knee PROM per protocol   Rationale:      decrease pain, increase ROM, increase tissue extensibility, decrease edema  and decrease trigger points to improve patient's ability to regain L knee PROM>AAROM>AROM. Billed With/As:   [x] TE   [] TA   [] Neuro   [] Self Care Patient Education: [x] Review HEP    [] Progressed/Changed HEP based on:   [] positioning   [] body mechanics   [] transfers   [] heat/ice application    [] other:      Other Objective/Functional Measures:    Ambulated around clinic without brace and 1 axillary crutch per MD protocol. Progressed therex to improve quadriceps and hamstring strength within MD protocol (see flowsheet). L knee AROM: 0 to 118 deg      Post Treatment Pain Level (on 0 to 10) scale:  0/ 10     ASSESSMENT  Assessment/Changes in Function:     Decrease tightness and improved patellar mobility compared to previous PT sessions. Demonstrated good gait mechanics and confidence with amb without brace. []  See Progress Note/Recertification   Patient will continue to benefit from skilled PT services to modify and progress therapeutic interventions, address functional mobility deficits, address ROM deficits, address strength deficits, analyze and address soft tissue restrictions, analyze and cue movement patterns, analyze and modify body mechanics/ergonomics, assess and modify postural abnormalities and instruct in home and community integration to attain remaining goals. Progress toward goals / Updated goals:    New Goals to be achieved in __8-12__  treatments:  1. Pt to demonstrate independence with HEP to improve L knee AROM and quad strength for ADLs. Met 04/30  2. Pt to demonstrate 0 degrees L knee extension AROM to improve stability with amb. Met 05/13; 0 deg  3. Pt to demonstrate 10 degree or > improvement in L knee flexion AROM to improve mobility for ADLs.  Progressing 05/13 ; AAROM 118 deg     PLAN  [x]  Upgrade activities as tolerated yes Continue plan of care   []  Discharge due to :    []  Other:      Therapist: JED Barr    Date: 5/13/2020 Time: 11:02 AM     Future Appointments   Date Time Provider Olga Lidia Marie   5/13/2020 10:00 AM Ivanna Monroy [facility]   5/18/2020  9:00 AM Kirill Noel [facility]   5/20/2020  1:00 PM Gina Jeronimo, LILLIE [facility]   5/27/2020  9:00 AM Kirill Noel [facility]

## 2020-05-18 ENCOUNTER — HOSPITAL ENCOUNTER (OUTPATIENT)
Dept: PHYSICAL THERAPY | Age: 50
Discharge: HOME | End: 2020-05-18
Payer: COMMERCIAL

## 2020-05-18 PROCEDURE — 97110 THERAPEUTIC EXERCISES: CPT

## 2020-05-18 PROCEDURE — 97140 MANUAL THERAPY 1/> REGIONS: CPT

## 2020-05-18 PROCEDURE — 97014 ELECTRIC STIMULATION THERAPY: CPT

## 2020-05-18 NOTE — PROGRESS NOTES
[facility]ICAL [facility] - DAILY TREATMENT NOTE    Patient Name: Cara Bergeron        Date: 2020  : 1970   yes Patient  Verified  Visit #:     Insurance: Payor: Katharine Lipoma / Plan: [place] [employer] [place] / Product Type: PPO /      In time: 8:58 Out time: 10:28   Total Treatment Time: 90     Medicare/[facility] Time Tracking (below)   Total Timed Codes (min):  na 1:1 Treatment Time:  na     TREATMENT AREA =  Left knee pain [M25.562]    SUBJECTIVE  Pain Level (on 0 to 10 scale): 0 / 10   Medication Changes/New allergies or changes in medical history, any new surgeries or procedures?    no  If yes, update Summary List   Subjective Functional Status/Changes:  []  No changes reported     SEE PN       OBJECTIVE    Modalities Rationale:     decrease inflammation, decrease pain and increase muscle contraction/control to improve patient's ability to complete transfers  10 Min [x] Estim, type/location: [place] to L RF and VMO; 10/30; 65 bps                                    []  att     [x]  unatt     []  w/US     [x]  w/ice    []  w/heat    min []  Mechanical Traction: type/lbs                   []  pro   []  sup   []  int   []  cont    []  before manual    []  after manual    min []  Ultrasound, settings/location:      min []  Iontophoresis w/ dexamethasone, location:                                               []  take home patch       []  in clinic    min []  Ice     []  Heat    location/position:     min []  Vasopneumatic Device, press/temp:     min []  Other:    [x] Skin assessment post-treatment (if applicable):    [x]  intact    [x]  redness- no adverse reaction     []redness - adverse reaction:       55 min Therapeutic Exercise:  [x]  See flow sheet   Rationale:      increase ROM and increase strength to improve the patients ability to perform functional ADLs.      25 min Manual Therapy: L knee assessment; MET correction/shotgun technique for R ant inn; IASTM/DTM to L gastroc, L quad, and L medial hamstrings; L pat sup/inf mobs, L knee PROM per protocol   Rationale:      decrease pain, increase ROM, increase tissue extensibility, decrease edema  and decrease trigger points to improve patient's ability to regain L knee PROM>AAROM>AROM. Billed With/As:   [x] TE   [] TA   [] Neuro   [] Self Care Patient Education: [x] Review HEP    [] Progressed/Changed HEP based on:   [] positioning   [] body mechanics   [] transfers   [] heat/ice application    [] other:      Other Objective/Functional Measures:    SEE PN     Post Treatment Pain Level (on 0 to 10) scale:  0 / 10     ASSESSMENT  Assessment/Changes in Function:     SEE PN     []  See Progress Note/Recertification   Patient will continue to benefit from skilled PT services to modify and progress therapeutic interventions, address functional mobility deficits, address ROM deficits, address strength deficits, analyze and address soft tissue restrictions, analyze and cue movement patterns, analyze and modify body mechanics/ergonomics, assess and modify postural abnormalities and instruct in home and community integration to attain remaining goals.    Progress toward goals / Updated goals:    SEE PN     PLAN  [x]  Upgrade activities as tolerated yes Continue plan of care   []  Discharge due to :    []  Other:      Therapist: JED Gentile    Date: 5/18/2020 Time: 10:39 AM     Future Appointments   Date Time Provider Olga Lidia Marie   5/18/2020  9:00 AM Daniela Kim [facility]   5/20/2020  8:00 AM Woo Hendrickson, PT [facility]   5/27/2020  9:00 AM Melchor, Myna Phoenix [facility]

## 2020-05-18 NOTE — PROGRESS NOTES
[facility] PHYSICAL THERAPY  [address] [place] [address] [address],[facility], [address] [facility] - Phone: [phone]  Fax: [phone]  PROGRESS NOTE  Patient Name: Libra Prasad : 1970   Treatment/Medical Diagnosis: Left knee pain [M25.562]   Referral Source: Vitaly Jaquez MD     Date of Initial Visit: 20 Attended Visits: 12 Missed Visits: 0     SUMMARY OF TREATMENT  Patient has attended 12 PT sessions, including an initial eval, for L knee pain (s/p ACLR 20). Gap in attendance due to nationwide concerns of COVID-19. PT interventions have included manual therapy, therapeutic exercises, patient education, and HEP. [place] electric stimulation with CP to increase muscle contraction/control. CURRENT STATUS  Patient has consistently attended PT 2-3x/wk since last progress note on 20 and has made good progress thus far. In the last 2 weeks, pain has ranged between 0-2/10,. Began ambulating this week without brace and crutches - requires min cues for heel-toe gait pattern. Current objective findings: L knee P/AROM 0/-1 deg to 125/119 deg; step ups on 6 inch step; SLS R 60 seconds L 12 seconds; min increase in mid stance with ambulation; min to mod knee valgus with squats and step ups; decrease VMO activitiy    Goal/Measure of Progress Goal Met? 1.  Pt to demonstrate independence with HEP to improve L knee AROM and quad strength for ADL   Status at last Eval: HEP established Current Status: Independent and compliant met   2. Pt to demonstrate 0 degrees L knee extension AROM to improve stability with amb. Status at last Eval: -11 Current Status: -1 progresisng   3. Pt to demonstrate 10 degree or > improvement in L knee flexion AROM to improve mobility for ADLs. Status at last Eval: 101 Current Status: 119 met     New Goals to be achieved in __8__  treatments:  1.  Patient will be independent and compliant with advanced, long-term HEP in prep for D/C to home.  2. Patient will demonstrate 0-120 deg of L knee AROM to improve mobility for prolonged walking activities. .  3. Patient to perform 10 repetitions of 6 inch step downs without compensatory strategies to improve ease with obstacle negotiation. RECOMMENDATIONS  Patient would continue to benefit from skilled PT interventions for 2x/wk for 8 treatments to improve L knee ROM, flexibility, and strength within MD protocol to perform functional mobility activities. If you have any questions/comments please contact us directly at [phone]. Thank you for allowing us to assist in the care of your patient. Therapist Signature: JED Sellers Date: 5/18/2020    Lisette Oseguera PT Time: 10:38 AM   NOTE TO PHYSICIAN:  PLEASE COMPLETE THE ORDERS BELOW AND FAX TO   [facility] Physical Therapy: [phone]  If you are unable to process this request in 24 hours please contact our office: [phone]    ___ I have read the above report and request that my patient continue as recommended.   ___ I have read the above report and request that my patient continue therapy with the following changes/special instructions:_________________________________________________________   ___ I have read the above report and request that my patient be discharged from therapy.      Physician Signature:        Date:       Time:

## 2020-05-20 ENCOUNTER — HOSPITAL ENCOUNTER (OUTPATIENT)
Dept: PHYSICAL THERAPY | Age: 50
Discharge: HOME | End: 2020-05-20
Payer: COMMERCIAL

## 2020-05-20 ENCOUNTER — HOSPITAL ENCOUNTER (OUTPATIENT)
Dept: PHYSICAL THERAPY | Age: 50
End: 2020-05-20
Payer: COMMERCIAL

## 2020-05-20 PROCEDURE — 97140 MANUAL THERAPY 1/> REGIONS: CPT | Performed by: PHYSICAL THERAPIST

## 2020-05-20 PROCEDURE — 97110 THERAPEUTIC EXERCISES: CPT | Performed by: PHYSICAL THERAPIST

## 2020-05-20 NOTE — PROGRESS NOTES
[facility]   PHYSICAL THERAPY - DAILY TREATMENT NOTE    Patient Name: Nicole Mcqueen        Date: 2020  : 1970   yes Patient  Verified  Visit #:   15   of   24  Insurance: Payor: Siri Hu / Plan: Oliva Crimes PPO / Product Type: PPO /      In time: 800 Out time: 923   Total Treatment Time: 70     Medicare/[employer]BS Time Tracking (below)   Total Timed Codes (min):  na 1:1 Treatment Time:  na     TREATMENT AREA =  Left knee pain [M25.562]    SUBJECTIVE  Pain Level (on 0 to 10 scale):  0  / 10   Medication Changes/New allergies or changes in medical history, any new surgeries or procedures?    no  If yes, update Summary List   Subjective Functional Status/Changes:  []  No changes reported     No c/o everything is feeling like it is moving along           OBJECTIVE  Modalities Rationale:     decrease inflammation, decrease pain and increase tissue extensibility to improve patient's ability to complete adls   min [] Estim, type/location:                                      []  att     []  unatt     []  w/US     []  w/ice    []  w/heat    min []  Mechanical Traction: type/lbs                   []  pro   []  sup   []  int   []  cont    []  before manual    []  after manual    min []  Ultrasound, settings/location:      min []  Iontophoresis w/ dexamethasone, location:                                               []  take home patch       []  in clinic   10 min [x]  Ice     []  Heat    location/position: Supine with bolster     min []  Vasopneumatic Device, press/temp:     min []  Other:    [x] Skin assessment post-treatment (if applicable):    []  intact    []  redness- no adverse reaction     []redness - adverse reaction:        40 min Therapeutic Exercise:  [x]  See flow sheet   Rationale:      increase ROM, increase strength, improve coordination, improve balance and increase proprioception to improve the patients ability to complete adls     20 min Manual Therapy: L knee prom, stm anterior medial knee, dtm quad Rationale:      decrease pain, increase ROM, increase tissue extensibility and decrease trigger points to improve patient's ability to complete adls      Billed With/As:   [] TE   [] TA   [] Neuro   [] Self Care Patient Education: [x] Review HEP    [] Progressed/Changed HEP based on:   [] positioning   [] body mechanics   [] transfers   [] heat/ice application    [] other:      Other Objective/Functional Measures:    Initiated session with MT followed by progression of TE  ttp along pes anserine and medial incision  TE took increased time to complete new exercises to ensure appropriate glute and quad activation     Post Treatment Pain Level (on 0 to 10) scale:   0  / 10     ASSESSMENT  Assessment/Changes in Function:     Reduced glute med and eccentric quad strength contributing to valgus collapse during wb     []  See Progress Note/Recertification   Patient will continue to benefit from skilled PT services to modify and progress therapeutic interventions, address functional mobility deficits, address ROM deficits, address strength deficits, analyze and address soft tissue restrictions, analyze and cue movement patterns, analyze and modify body mechanics/ergonomics, assess and modify postural abnormalities, address imbalance/dizziness and instruct in home and community integration to attain remaining goals.    Progress toward goals / Updated goals:    Progressing into next phase of protocol to address goals - pt to see MD today with first in office f/u since COVID 19 will modify ant POC based on visit     PLAN  []  Upgrade activities as tolerated yes Continue plan of care   []  Discharge due to :    []  Other:      Therapist: Steffen Mclaughlin, PT    Date: 5/20/2020 Time: 8:04 AM     Future Appointments   Date Time Provider lOga Lidia Marie   5/27/2020  9:00 AM [address] [address], [facility]   6/1/2020  9:30 AM Bert [facility]   6/3/2020  9:30 AM Bert [facility]   6/8/2020  9:30 AM Bert Vero Gilliam [facility]   6/10/2020  9:30 AM Vero Noel [facility]   6/15/2020  9:30 AM Vero Noel [facility]   6/17/2020  9:30 AM Ishmael Claude M [facility]

## 2020-05-27 ENCOUNTER — HOSPITAL ENCOUNTER (OUTPATIENT)
Dept: PHYSICAL THERAPY | Age: 50
Discharge: HOME | End: 2020-05-27
Payer: COMMERCIAL

## 2020-05-27 PROCEDURE — 97110 THERAPEUTIC EXERCISES: CPT

## 2020-05-27 PROCEDURE — 97140 MANUAL THERAPY 1/> REGIONS: CPT

## 2020-05-27 NOTE — PROGRESS NOTES
[facility] PHYSICAL THERAPY - DAILY TREATMENT NOTE    Patient Name: Ricardo Walter        Date: 2020  : 1970   yes Patient  Verified  Visit #:   15   of   20-24  Insurance: Payor: Eric Bonner / [place]: [place] / Product Type: PPO /      In time: 8:50 Out time: 10:13   Total Treatment Time: 83     Medicare/[facility] Time Tracking (below)   Total Timed Codes (min):  na 1:1 Treatment Time:  na     TREATMENT AREA =  Left knee pain [M25.562]    SUBJECTIVE  Pain Level (on 0 to 10 scale): 2 / 10   Medication Changes/New allergies or changes in medical history, any new surgeries or procedures?    no  If yes, update Summary List   Subjective Functional Status/Changes:  []  No changes reported     Patient reports being more sore (inside of the knee) today due to doing yard work over the week (ie: repetitive bending to pick weeds/grass), denies lifting or carrying. Also reports seeing MD's PA after LV and per pt, PA was satisfied with progress.        OBJECTIVE    Modalities Rationale:     decrease inflammation, decrease pain and increase muscle contraction/control to improve patient's ability to complete transfers   Min [] Estim, type/location:                                     []  att     []  unatt     []  w/US     []  w/ice    []  w/heat    min []  Mechanical Traction: type/lbs                   []  pro   []  sup   []  int   []  cont    []  before manual    []  after manual    min []  Ultrasound, settings/location:      min []  Iontophoresis w/ dexamethasone, location:                                               []  take home patch       []  in clinic   10 Min [x]  Ice     []  Heat    Location/position: To L knee in supine     min []  Vasopneumatic Device, press/temp:     min []  Other:    [x] Skin assessment post-treatment (if applicable):    [x]  intact    [x]  redness- no adverse reaction     []redness - adverse reaction:       58 min Therapeutic Exercise:  [x]  See flow sheet   Rationale:      increase ROM and increase strength to improve the patients ability to perform functional ADLs. 15 min Manual Therapy: DTM/TPR to L L gastroc, L quad, and L medial hamstrings; L pat sup/inf mobs, L knee PROM per protocol   Rationale:      decrease pain, increase ROM, increase tissue extensibility, decrease edema  and decrease trigger points to improve patient's ability to regain L knee PROM>AAROM>AROM. Billed With/As:   [x] TE   [] TA   [] Neuro   [] Self Care Patient Education: [x] Review HEP    [] Progressed/Changed HEP based on:   [] positioning   [] body mechanics   [] transfers   [] heat/ice application    [] other:      Other Objective/Functional Measures: Added 4 inch lateral step downs, steamboats, and lateral walk (knees bent and straight leg) to improve LE strength and stability. Significant TTP noted along medial gastroc and medial scar. Continues to require cues for heel toe gait pattern during amb around clinic. Post Treatment Pain Level (on 0 to 10) scale:  1-2 / 10     ASSESSMENT  Assessment/Changes in Function:     Advised pt to stretch and perform self CFM to improve tenderness of affected areas. Pt verbalized understanding. Will continue to benefit from skilled PT to improve valgus collapse with increasing glute med and eccentric quad strength. []  See Progress Note/Recertification   Patient will continue to benefit from skilled PT services to modify and progress therapeutic interventions, address functional mobility deficits, address ROM deficits, address strength deficits, analyze and address soft tissue restrictions, analyze and cue movement patterns, analyze and modify body mechanics/ergonomics, assess and modify postural abnormalities and instruct in home and community integration to attain remaining goals. Progress toward goals / Updated goals:    New Goals to be achieved in __8__  treatments:  1.  Patient will be independent and compliant with advanced, long-term HEP in prep for D/C to home. 2. Patient will demonstrate 0-120 deg of L knee AROM to improve mobility for prolonged walking activities. .  3. Patient to perform 10 repetitions of 6 inch step downs without compensatory strategies to improve ease with obstacle negotiation.   progressing 05/27; initiated exercise on 4 inch step     PLAN  [x]  Upgrade activities as tolerated yes Continue plan of care   []  Discharge due to :    []  Other:      Therapist: JED Howell    Date: 5/27/2020 Time: 10:20 AM     Future Appointments   Date Time Provider Olga Lidia Marie   5/27/2020  9:00 AM Noel, [facility]   6/1/2020  9:30 AM Noel, [facility]   6/3/2020  9:30 AM Noel, [facility]   6/8/2020  9:30 AM Noel, [facility]   6/10/2020  9:30 AM Noel, [facility]   6/15/2020  9:30 AM Noel, [facility]   6/17/2020  9:30 AM Etienne Moots [facility]

## 2020-05-29 ENCOUNTER — HOSPITAL ENCOUNTER (OUTPATIENT)
Dept: PHYSICAL THERAPY | Age: 50
Discharge: HOME | End: 2020-05-29
Payer: COMMERCIAL

## 2020-05-29 PROCEDURE — 97140 MANUAL THERAPY 1/> REGIONS: CPT

## 2020-05-29 PROCEDURE — 97110 THERAPEUTIC EXERCISES: CPT

## 2020-05-29 NOTE — PROGRESS NOTES
Babita Billing PHYSICAL THERAPY - DAILY TREATMENT NOTE    Patient Name: Davina Lucio        Date: 2020  : 1970   yes Patient  Verified  Visit #:     Insurance: Payor: Jayde Abraham / Plan: [place] [employer] / Product Type: PPO /       In time: 11:55 Out time: 1:05   Total Treatment Time: 70     Medicare/[facility] Time Tracking (below)   Total Timed Codes (min):  na 1:1 Treatment Time:  na     TREATMENT AREA =  Left knee pain [M25.562]    SUBJECTIVE  Pain Level (on 0 to 10 scale): 0 / 10   Medication Changes/New allergies or changes in medical history, any new surgeries or procedures?    no  If yes, update Summary List   Subjective Functional Status/Changes:  []  No changes reported     Patient reports being a little sore since LV, but no pain. States that she feels confident ascending stairs with recip pattern with handrails, however has been hesitant descending stairs. Continues to descend with nonrecip pattern.         OBJECTIVE    Modalities Rationale:     decrease inflammation, decrease pain and increase muscle contraction/control to improve patient's ability to complete transfers   Min [] Estim, type/location:                                     []  att     []  unatt     []  w/US     []  w/ice    []  w/heat    min []  Mechanical Traction: type/lbs                   []  pro   []  sup   []  int   []  cont    []  before manual    []  after manual    min []  Ultrasound, settings/location:      min []  Iontophoresis w/ dexamethasone, location:                                               []  take home patch       []  in clinic   10 Min [x]  Ice     []  Heat    Location/position: To L knee in supine     min []  Vasopneumatic Device, press/temp:     min []  Other:    [x] Skin assessment post-treatment (if applicable):    [x]  intact    [x]  redness- no adverse reaction     []redness - adverse reaction:       40 min Therapeutic Exercis:  [x]  See flow sheet   Rationale:      increase ROM and increase strength to improve the patients ability to perform functional ADLs. 20 min Manual Therapy: DTM/TPR to L L gastroc, L RF, L VL, and L medial hamstrings; L pat sup/inf mobs, L knee PROM per protocol   Rationale:      decrease pain, increase ROM, increase tissue extensibility, decrease edema  and decrease trigger points to improve patient's ability to regain L knee PROM>AAROM>AROM. Billed With/As:   [x] TE   [] TA   [] Neuro   [] Self Care Patient Education: [x] Review HEP    [] Progressed/Changed HEP based on:   [] positioning   [] body mechanics   [] transfers   [] heat/ice application    [] other:      Other Objective/Functional Measures:    L knee P/AROM: 0-125 deg; 1-120 deg  Progressed therex within MD protocol to improve L LE stability and strength (see flowsheet)   Performed stair negotiation 1x with B handrails and recip gait pattern. Req'd mod encouragement for descending due to fear      Post Treatment Pain Level (on 0 to 10) scale:  1 / 10     ASSESSMENT  Assessment/Changes in Function:     Continues to demo lack full TKE during heel strike during ambulation. Otherwise pt progressing well with improving strength and stability. []  See Progress Note/Recertification   Patient will continue to benefit from skilled PT services to modify and progress therapeutic interventions, address functional mobility deficits, address ROM deficits, address strength deficits, analyze and address soft tissue restrictions, analyze and cue movement patterns, analyze and modify body mechanics/ergonomics, assess and modify postural abnormalities and instruct in home and community integration to attain remaining goals. Progress toward goals / Updated goals:    New Goals to be achieved in __8__  treatments:  1. Patient will be independent and compliant with advanced, long-term HEP in prep for D/C to home. 2. Patient will demonstrate 0-120 deg of L knee AROM to improve mobility for prolonged walking activities. .progressing 05/29  3. Patient to perform 10 repetitions of 6 inch step downs without compensatory strategies to improve ease with obstacle negotiation.   progressing 05/27; initiated exercise on 4 inch step     PLAN  [x]  Upgrade activities as tolerated yes Continue plan of care   []  Discharge due to :    []  Other:      Therapist: JED Patel    Date: 5/29/2020 Time: 1:34 PM     Future Appointments   Date Time Provider Olga Lidia Marie   6/1/2020  9:30 AM Yeny Noel [facility]   6/3/2020  9:30 AM Yeny Noel [facility]   6/8/2020  9:30 AM Yeny Noel [facility]   6/10/2020  9:30 AM Yeny Noel [facility]   6/15/2020  9:30 AM Yeny Noel [facility]   6/17/2020  9:30 AM Veronica Cordoba [facility]

## 2020-06-01 ENCOUNTER — HOSPITAL ENCOUNTER (OUTPATIENT)
Dept: PHYSICAL THERAPY | Age: 50
Discharge: HOME | End: 2020-06-01
Payer: COMMERCIAL

## 2020-06-01 PROCEDURE — 97140 MANUAL THERAPY 1/> REGIONS: CPT

## 2020-06-01 PROCEDURE — 97110 THERAPEUTIC EXERCISES: CPT

## 2020-06-01 PROCEDURE — 97014 ELECTRIC STIMULATION THERAPY: CPT

## 2020-06-01 NOTE — PROGRESS NOTES
CC:  Arie Sandoval is here today for eye exam.  Vision stable.    Medications, allergies,  past medical, past surgical and pertinent family histories reviewed and updated where needed in the EMR.    Ocular Medications:  none    Denies known Latex allergy or symptoms of Latex sensitivity.     Yuly Draper PHYSICAL THERAPY - DAILY TREATMENT NOTE    Patient Name: Elza Gant        Date: 2020  : 1970   yes Patient  Verified  Visit #:     Insurance: Payor: Benitez Mackey / Plan: [place] [employer] P[place] / Product Type: PPO /       In time: 9:29 Out time: 10:30   Total Treatment Time: 61     Medicare/[facility] Time Tracking (below)   Total Timed Codes (min):  na 1:1 Treatment Time:  na     TREATMENT AREA =  Left knee pain [M25.562]    SUBJECTIVE  Pain Level (on 0 to 10 scale): 0 / 10   Medication Changes/New allergies or changes in medical history, any new surgeries or procedures?    no  If yes, update Summary List   Subjective Functional Status/Changes:  []  No changes reported     Patient reports stretching and rolling her muscles this weekend. OBJECTIVE    Modalities Rationale:     decrease inflammation, decrease pain and increase muscle contraction/control to improve patient's ability to complete transfers  10 Min [x] Estim, type/location: [place] to L RF and VMO; 10/30; 65 bps                                    []  att     [x]  unatt     []  w/US     [x]  w/ice    []  w/heat    min []  Mechanical Traction: type/lbs                   []  pro   []  sup   []  int   []  cont    []  before manual    []  after manual    min []  Ultrasound, settings/location:      min []  Iontophoresis w/ dexamethasone, location:                                               []  take home patch       []  in clinic    Min []  Ice     []  Heat    Location/position:     min []  Vasopneumatic Device, press/temp:     min []  Other:    [x] Skin assessment post-treatment (if applicable):    [x]  intact    [x]  redness- no adverse reaction     []redness  adverse reaction:       36 min Therapeutic Exercis:  [x]  See flow sheet   Rationale:      increase ROM and increase strength to improve the patients ability to perform functional ADLs.      15 min Manual Therapy: DTM/TPR to L quadriceps; L pat sup/inf mobs, L knee PROM per protocol   Rationale:      decrease pain, increase ROM, increase tissue extensibility, decrease edema  and decrease trigger points to improve patient's ability to regain L knee PROM>AAROM>AROM. Billed With/As:   [x] TE   [] TA   [] Neuro   [] Self Care Patient Education: [x] Review HEP    [] Progressed/Changed HEP based on:   [] positioning   [] body mechanics   [] transfers   [] heat/ice application    [] other:      Other Objective/Functional Measures:    Palpable trigger points along RF and VL noted during MT. Able to achieve 0 deg AROM extension following MT. Post Treatment Pain Level (on 0 to 10) scale:  1 / 10     ASSESSMENT  Assessment/Changes in Function:     Demonstrated improved tolerance and decrease knee valgus during quad strengthening therex, however continues to require cues for proper execution. Advised pt to continue stretching and using rolling pin to assist with quadriceps tightness. []  See Progress Note/Recertification   Patient will continue to benefit from skilled PT services to modify and progress therapeutic interventions, address functional mobility deficits, address ROM deficits, address strength deficits, analyze and address soft tissue restrictions, analyze and cue movement patterns, analyze and modify body mechanics/ergonomics, assess and modify postural abnormalities and instruct in home and community integration to attain remaining goals. Progress toward goals / Updated goals:    New Goals to be achieved in __8__  treatments:  1. Patient will be independent and compliant with advanced, long-term HEP in prep for D/C to home. 2. Patient will demonstrate 0-120 deg of L knee AROM to improve mobility for prolonged walking activities. .progressing 06/01  3. Patient to perform 10 repetitions of 6 inch step downs without compensatory strategies to improve ease with obstacle negotiation.   progressing 05/27; initiated exercise on 4 inch step     PLAN  [x]  Upgrade activities as tolerated yes Continue plan of care   []  Discharge due to :    []  Other:      Therapist: JED Marrufo    Date: 6/1/2020 Time: 10:45 AM     Future Appointments   Date Time Provider Olga Lidia Marie   6/1/2020  9:30 AM Jean Pierre Noel [facility]   6/3/2020  9:30 AM Jean Marie Noel [facility]   6/8/2020  9:30 AM Jean Marie Noel [facility]   6/10/2020  9:30 AM Jean Marie Noel Shoulder [facility]   6/15/2020  9:30 AM Jean Marie Noel [id number]   6/17/2020  9:30 AM Jean Marie Noel [id number]

## 2020-06-03 ENCOUNTER — HOSPITAL ENCOUNTER (OUTPATIENT)
Dept: PHYSICAL THERAPY | Age: 50
Discharge: HOME | End: 2020-06-03
Payer: COMMERCIAL

## 2020-06-03 PROCEDURE — 97110 THERAPEUTIC EXERCISES: CPT

## 2020-06-03 PROCEDURE — 97140 MANUAL THERAPY 1/> REGIONS: CPT

## 2020-06-03 NOTE — PROGRESS NOTES
Joycelyn Castanon PHYSICAL THERAPY - DAILY TREATMENT NOTE    Patient Name: Jose Beaulieu        Date: 6/3/2020  : 1970   yes Patient  Verified  Visit #:     Insurance: Payor: Cally Shin / Plan: [place] [employer] P[place] / Product Type: PPO /       In time: 9:36 Out time: 10:39   Total Treatment Time: 63     Medicare/[facility] Time Tracking (below)   Total Timed Codes (min):  na 1:1 Treatment Time:  na     TREATMENT AREA =  Left knee pain [M25.562]    SUBJECTIVE  Pain Level (on 0 to 10 scale): 0 / 10   Medication Changes/New allergies or changes in medical history, any new surgeries or procedures?    no  If yes, update Summary List   Subjective Functional Status/Changes:  []  No changes reported     Pt reports no new complaints of pain. OBJECTIVE    Modalities Rationale:     decrease inflammation, decrease pain and increase muscle contraction/control to improve patient's ability to complete transfers   Min [] Estim, type/location:                                     []  att     []  unatt     []  w/US     []  w/ice    []  w/heat    min []  Mechanical Traction: type/lbs                   []  pro   []  sup   []  int   []  cont    []  before manual    []  after manual    min []  Ultrasound, settings/location:      min []  Iontophoresis w/ dexamethasone, location:                                               []  take home patch       []  in clinic   10 Min [x]  Ice     []  Heat    Location/position: To L knee in supine with 5lb weight    min []  Vasopneumatic Device, press/temp:     min []  Other:    [x] Skin assessment post-treatment (if applicable):    [x]  intact    [x]  redness- no adverse reaction     []redness  adverse reaction:       38  min Therapeutic Exercis:  [x]  See flow sheet   Rationale:      increase ROM and increase strength to improve the patients ability to perform functional ADLs.      15 min Manual Therapy: DTM/TPR to L quadriceps; L pat sup/inf mobs, L knee PROM per protocol   Rationale: decrease pain, increase ROM, increase tissue extensibility, decrease edema  and decrease trigger points to improve patient's ability to regain L knee PROM>AAROM>AROM. Billed With/As:   [x] TE   [] TA   [] Neuro   [] Self Care Patient Education: [x] Review HEP    [] Progressed/Changed HEP based on:   [] positioning   [] body mechanics   [] transfers   [] heat/ice application    [] other:      Other Objective/Functional Measures: Added/progressed therex to improve knee stability and strength (see flowsheet)   Increase tenderness and palpable trigger points along lateral thigh into distal hip flexors. 0 deg active knee extension post MT. -2 deg active knee extension post tx session. Post Treatment Pain Level (on 0 to 10) scale:  0 / 10     ASSESSMENT  Assessment/Changes in Function:     Advised pt to use rolling pin to lateral thigh and perform stretches to decrease tenderness and improve knee extension. Pt would continue to benefit from skilled PT to improve knee AROM and stability. []  See Progress Note/Recertification   Patient will continue to benefit from skilled PT services to modify and progress therapeutic interventions, address functional mobility deficits, address ROM deficits, address strength deficits, analyze and address soft tissue restrictions, analyze and cue movement patterns, analyze and modify body mechanics/ergonomics, assess and modify postural abnormalities and instruct in home and community integration to attain remaining goals. Progress toward goals / Updated goals:    New Goals to be achieved in __8__  treatments:  1. Patient will be independent and compliant with advanced, long-term HEP in prep for D/C to home. 2. Patient will demonstrate 0-120 deg of L knee AROM to improve mobility for prolonged walking activities. .progressing 06/01  3. Patient to perform 10 repetitions of 6 inch step downs without compensatory strategies to improve ease with obstacle negotiation. progressing 05/27; initiated exercise on 4 inch step     PLAN  [x]  Upgrade activities as tolerated yes Continue plan of care   []  Discharge due to :    []  Other:      Therapist: JED Gimenez    Date: 6/3/2020 Time: 10:48 AM     Future Appointments   Date Time Provider Olga Lidia Marie   6/3/2020  9:30 AM Fredy Noel [facility]   6/8/2020  9:30 AM Yony Noel [facility]   6/10/2020  9:30 AM Yony Noel Ch[facility]   6/15/2020  9:30 AM Yony Noel Chaya [facility]   6/17/2020  9:30 AM Aliza Noelino Chaya [facility]

## 2020-06-08 ENCOUNTER — HOSPITAL ENCOUNTER (OUTPATIENT)
Dept: PHYSICAL THERAPY | Age: 50
Discharge: HOME | End: 2020-06-08
Payer: COMMERCIAL

## 2020-06-08 PROCEDURE — 97110 THERAPEUTIC EXERCISES: CPT

## 2020-06-08 PROCEDURE — 97016 VASOPNEUMATIC DEVICE THERAPY: CPT

## 2020-06-08 PROCEDURE — 97140 MANUAL THERAPY 1/> REGIONS: CPT

## 2020-06-08 NOTE — PROGRESS NOTES
Jourdan Ricci PHYSICAL THERAPY - DAILY TREATMENT NOTE    Patient Name: Gume Castro        Date: 2020  : 1970   yes Patient  Verified  Visit #:     Insurance: Payor: James Fernandez / Plan: [place] [employer] PPO / Product Type: PPO /       In time: 9:25 Out time: 10:45   Total Treatment Time: 80-     Medicare/[facility] Time Tracking (below)   Total Timed Codes (min):  na 1:1 Treatment Time:  na     TREATMENT AREA =  Left knee pain [M25.562]    SUBJECTIVE  Pain Level (on 0 to 10 scale): 2 / 10   Medication Changes/New allergies or changes in medical history, any new surgeries or procedures?    no  If yes, update Summary List   Subjective Functional Status/Changes:  []  No changes reported     Pt [place] and feeling more sore on the inside of the L knee. Contributes increase soreness due to wearing the wrong shoes. States that she iced it this morning but not after walking.       OBJECTIVE    Modalities Rationale:     decrease inflammation, decrease pain and increase muscle contraction/control to improve patient's ability to complete transfers   Min [] Estim, type/location:                                     []  att     []  unatt     []  w/US     []  w/ice    []  w/heat    min []  Mechanical Traction: type/lbs                   []  pro   []  sup   []  int   []  cont    []  before manual    []  after manual    min []  Ultrasound, settings/location:      min []  Iontophoresis w/ dexamethasone, location:                                               []  take home patch       []  in clinic    Min []  Ice     []  Heat    Location/position:    8 Min [x]  Vasopneumatic Device, press/temp: Med/38 deg to L knee in supine    min []  Other:    [x] Skin assessment post-treatment (if applicable):    [x]  intact    [x]  redness- no adverse reaction     []redness  adverse reaction:       50  min Therapeutic Exercis:  [x]  See flow sheet   Rationale:      increase ROM and increase strength to improve the patients ability to perform functional ADLs. 20 min Manual Therapy: DTM/TPR to L quadriceps and hamstrings; L pat sup/inf mobs, L knee PROM per protocol   Rationale:      decrease pain, increase ROM, increase tissue extensibility, decrease edema  and decrease trigger points to improve patient's ability to regain L knee PROM>AAROM>AROM. Billed With/As:   [x] TE   [] TA   [] Neuro   [] Self Care Patient Education: [x] Review HEP    [] Progressed/Changed HEP based on:   [] positioning   [] body mechanics   [] transfers   [] heat/ice application    [] other:      Other Objective/Functional Measures:    Progressed therex to challenge/improve knee stability and core strength (see flowsheet)   Reduced tenderness in quadriceps compared to previous PT sessions. Post Treatment Pain Level (on 0 to 10) scale:  0 / 10     ASSESSMENT  Assessment/Changes in Function:     Continues to demo increase knee flexion with valgus during midstance, however able to achieve full knee extension. Likely due to decrease glute med strength and eccentric quad strength. []  See Progress Note/Recertification   Patient will continue to benefit from skilled PT services to modify and progress therapeutic interventions, address functional mobility deficits, address ROM deficits, address strength deficits, analyze and address soft tissue restrictions, analyze and cue movement patterns, analyze and modify body mechanics/ergonomics, assess and modify postural abnormalities and instruct in home and community integration to attain remaining goals. Progress toward goals / Updated goals:    New Goals to be achieved in __8__  treatments:  1. Patient will be independent and compliant with advanced, long-term HEP in prep for D/C to home. 2. Patient will demonstrate 0-120 deg of L knee AROM to improve mobility for prolonged walking activities. .progressing 06/01  3.  Patient to perform 10 repetitions of 6 inch step downs without compensatory strategies to improve ease with obstacle negotiation.   progressing 06/08; able on 4 inch step with mirror     PLAN  [x]  Upgrade activities as tolerated yes Continue plan of care   []  Discharge due to :    []  Other:      Therapist: JED Barr    Date: 6/8/2020 Time: 11:05 AM     Future Appointments   Date Time Provider Olga Lidia Marie   6/8/2020  9:30 AM Trey Noel [facility]   6/10/2020  9:30 AM Kirill Noel [facility]   6/15/2020  9:30 AM Kirill Noel [id number]   6/17/2020  2:15 PM Ranjana Noel

## 2020-06-10 ENCOUNTER — HOSPITAL ENCOUNTER (OUTPATIENT)
Dept: PHYSICAL THERAPY | Age: 50
Discharge: HOME | End: 2020-06-10
Payer: COMMERCIAL

## 2020-06-10 PROCEDURE — 97140 MANUAL THERAPY 1/> REGIONS: CPT

## 2020-06-10 PROCEDURE — 97016 VASOPNEUMATIC DEVICE THERAPY: CPT

## 2020-06-10 PROCEDURE — 97110 THERAPEUTIC EXERCISES: CPT

## 2020-06-10 NOTE — PROGRESS NOTES
[facility] PHYSICAL [facility] - DAILY TREATMENT NOTE    Patient Name: Monty Willis        Date: 6/10/2020  : 1970   yes Patient  Verified  Visit #:     Insurance: Payor: Lauren Pickett / [place]: [place] [employer] / Product Type: PPO /       In time: 9:30 Out time: 10:59   Total Treatment Time: 89     Medicare/[facility] Time Tracking (below)   Total Timed Codes (min):  na 1:1 Treatment Time:  na     TREATMENT AREA =  Left knee pain [M25.562]    SUBJECTIVE  Pain Level (on 0 to 10 scale): 0 / 10   Medication Changes/New allergies or changes in medical history, any new surgeries or procedures?    no  If yes, update Summary List   Subjective Functional Status/Changes:  []  No changes reported     Patient reports no L knee pain. OBJECTIVE    Modalities Rationale:     decrease inflammation, decrease pain and increase muscle contraction/control to improve patient's ability to complete transfers   Min [] Estim, type/location:                                     []  att     []  unatt     []  w/US     []  w/ice    []  w/heat    min []  Mechanical Traction: type/lbs                   []  pro   []  sup   []  int   []  cont    []  before manual    []  after manual    min []  Ultrasound, settings/location:      min []  Iontophoresis w/ dexamethasone, location:                                               []  take home patch       []  in clinic    Min []  Ice     []  Heat    Location/position:    8 Min [x]  Vasopneumatic Device, press/temp: Med/38 deg to L knee in supine    min []  Other:    [x] Skin assessment post-treatment (if applicable):    [x]  intact    [x]  redness- no adverse reaction     []redness  adverse reaction:       49 min Therapeutic Exercis:  [x]  See flow sheet   Rationale:      increase ROM and increase strength to improve the patients ability to perform functional ADLs.      30 min Manual Therapy: L knee assessment; DTM/TPR to L quadriceps and hamstrings; L TC and pat sup/inf mobs, L knee PROM per protocol   Rationale:      decrease pain, increase ROM, increase tissue extensibility, decrease edema  and decrease trigger points to improve patient's ability to regain L knee PROM>AAROM>AROM. Billed With/As:   [x] TE   [] TA   [] Neuro   [] Self Care Patient Education: [x] Review HEP    [] Progressed/Changed HEP based on:   [] positioning   [] body mechanics   [] transfers   [] heat/ice application    [] other:      Other Objective/Functional Measures:    Unable to achieve full knee extension pre PT session (-1 deg)  Palpable trigger points and significant ttp along quadriceps. End range \"pull\" with PROM flex. L knee AROM post tx: 0-115 deg    Progressed therex to increase quadriceps strength (see flowsheet)      Post Treatment Pain Level (on 0 to 10) scale:  0 / 10     ASSESSMENT  Assessment/Changes in Function:     Demonstrated no exacerbation of symptoms with progression of therex, - noted more fatigue and soreness vs pain/red flags following PT session. Continues to lack TKE despite cues for emphasis on heel strike during amb likely due to quadriceps weakness. Would continue to benefit from skilled PT to improve carryover with knee ext     []  See Progress Note/Recertification   Patient will continue to benefit from skilled PT services to modify and progress therapeutic interventions, address functional mobility deficits, address ROM deficits, address strength deficits, analyze and address soft tissue restrictions, analyze and cue movement patterns, analyze and modify body mechanics/ergonomics, assess and modify postural abnormalities and instruct in home and community integration to attain remaining goals. Progress toward goals / Updated goals:    New Goals to be achieved in __8__  treatments:  1. Patient will be independent and compliant with advanced, long-term HEP in prep for D/C to home.   2. Patient will demonstrate 0-120 deg of L knee AROM to improve mobility for prolonged walking activities. .progressing 06/10  3. Patient to perform 10 repetitions of 6 inch step downs without compensatory strategies to improve ease with obstacle negotiation.   progressing 06/08; able on 4 inch step with mirror     PLAN  [x]  Upgrade activities as tolerated yes Continue plan of care   []  Discharge due to :    []  Other:      Therapist: JED Reagan    Date: 6/10/2020 Time: 11:01 AM     Future Appointments   Date Time Provider Olga Lidia Marie   6/10/2020  9:30 AM Vernon Noel [facility]   6/15/2020  9:30 AM Lizett Noel [id number]   6/17/2020  2:15 PM Lyle Lozano

## 2020-06-15 ENCOUNTER — HOSPITAL ENCOUNTER (OUTPATIENT)
Dept: PHYSICAL THERAPY | Age: 50
Discharge: HOME | End: 2020-06-15
Payer: COMMERCIAL

## 2020-06-15 PROCEDURE — 97110 THERAPEUTIC EXERCISES: CPT

## 2020-06-15 PROCEDURE — 97016 VASOPNEUMATIC DEVICE THERAPY: CPT

## 2020-06-15 NOTE — PROGRESS NOTES
[facility] PHYSICAL THERAP[facility] - DAILY TREATMENT NOTE    Patient Name: Mack Boas        Date: 6/15/2020  : 1970   yes Patient  Verified  Visit #:     Insurance: Payor: Charanjit Wills / Plan: [place] [employer] / Product Type: PPO /       In time: 9:25 Out time: 10:47   Total Treatment Time: 82     Medicare/[facility] Time Tracking (below)   Total Timed Codes (min):  na 1:1 Treatment Time:  na     TREATMENT AREA =  Left knee pain [M25.562]    SUBJECTIVE  Pain Level (on 0 to 10 scale): 0 / 10   Medication Changes/New allergies or changes in medical history, any new surgeries or procedures?    no  If yes, update Summary List   Subjective Functional Status/Changes:  []  No changes reported     Patient reports no pain in the L knee. States she is able to negotiate stairs confidently and notes no pain. OBJECTIVE    Modalities Rationale:     decrease inflammation, decrease pain and increase muscle contraction/control to improve patient's ability to complete transfers   Min [] Estim, type/location:                                     []  att     []  unatt     []  w/US     []  w/ice    []  w/heat    min []  Mechanical Traction: type/lbs                   []  pro   []  sup   []  int   []  cont    []  before manual    []  after manual    min []  Ultrasound, settings/location:      min []  Iontophoresis w/ dexamethasone, location:                                               []  take home patch       []  in clinic    Min []  Ice     []  Heat    Location/position:    8 Min [x]  Vasopneumatic Device, press/temp: Med/38 deg to L knee in supine    min []  Other:    [x] Skin assessment post-treatment (if applicable):    [x]  intact    [x]  redness- no adverse reaction     []redness  adverse reaction:       72 min Therapeutic Exercis:  [x]  See flow sheet   Rationale:      increase ROM and increase strength to improve the patients ability to perform functional ADLs.      held min Manual Therapy: L knee assessment; DTM/TPR to L quadriceps and hamstrings; L TC and pat sup/inf mobs, L knee PROM per protocol   Rationale:      decrease pain, increase ROM, increase tissue extensibility, decrease edema  and decrease trigger points to improve patient's ability to regain L knee PROM>AAROM>AROM. Billed With/As:   [x] TE   [] TA   [] Neuro   [] Self Care Patient Education: [x] Review HEP    [] Progressed/Changed HEP based on:   [] positioning   [] body mechanics   [] transfers   [] heat/ice application    [] other:      Other Objective/Functional Measures:    E-mailed updated HEP and issued appropriate resistance bands. Progressed therex to improve and challenge L LE strength (see flowsheet). L knee strength: flex 4/5, ext 4/5 with inconsistent contraction     Post Treatment Pain Level (on 0 to 10) scale:  0 / 10     ASSESSMENT  Assessment/Changes in Function:    Continues to demo min knee flex during TKE of gait due to decrease quadriceps strength. Pt quick to fatigue during strengthening therex, however denied any sharp pain or red flags. []  See Progress Note/Recertification   Patient will continue to benefit from skilled PT services to modify and progress therapeutic interventions, address functional mobility deficits, address ROM deficits, address strength deficits, analyze and address soft tissue restrictions, analyze and cue movement patterns, analyze and modify body mechanics/ergonomics, assess and modify postural abnormalities and instruct in home and community integration to attain remaining goals. Progress toward goals / Updated goals:    New Goals to be achieved in __8__  treatments:  1. Patient will be independent and compliant with advanced, long-term HEP in prep for D/C to home. Progressing 06/15; issued updated HEP  2. Patient will demonstrate 0-120 deg of L knee AROM to improve mobility for prolonged walking activities. .progressing 06/10  3.  Patient to perform 10 repetitions of 6 inch step downs without compensatory strategies to improve ease with obstacle negotiation.   progressing 06/08; able on 4 inch step with mirror     PLAN  [x]  Upgrade activities as tolerated yes Continue plan of care   []  Discharge due to :    []  Other:      Therapist: JED Polanco    Date: 6/15/2020 Time: 10:49 AM     Future Appointments   Date Time Provider Olga Lidia Marie   6/15/2020  9:30 AM Tyrone Noel [facility]   6/17/2020  2:15 PM Melchor, Lonie Sandifer

## 2020-06-17 ENCOUNTER — HOSPITAL ENCOUNTER (OUTPATIENT)
Dept: PHYSICAL THERAPY | Age: 50
Discharge: HOME | End: 2020-06-17
Payer: COMMERCIAL

## 2020-06-17 PROCEDURE — 97016 VASOPNEUMATIC DEVICE THERAPY: CPT

## 2020-06-17 PROCEDURE — 97110 THERAPEUTIC EXERCISES: CPT

## 2020-06-17 NOTE — PROGRESS NOTES
[facility] THERAPY  [address],[facility], [address] [facility] - Phone: [phone]  Fax: [phone]  PROGRESS NOTE  Patient Name: Gume Castro : 1970   Treatment/Medical Diagnosis: Left knee pain [M25.562]   Referral Source: Diego Quintero MD     Date of Initial Visit: 20 Attended Visits: 21 Missed Visits: 0     SUMMARY OF TREATMENT  Patient has attended 21 PT sessions, including an initial eval, for L knee pain (s/p ACLR 20). Gap in attendance due to nationwide concerns of COVID-19. PT interventions have included manual therapy, therapeutic exercises, patient education, and HEP. [place] electric stimulation with CP to increase muscle contraction/control. CURRENT STATUS  Patient is making steady progress to increase L LE strength. Reports no significant pain in the last 2 weeks and has increase confidence/ease with reciprocal stair negotiation. Able to achieve full extension, however challenged with maintaining TKE during ambulation and demonstrates slight increase knee flexion during midstance. This is likely due to quadriceps weakness. PT program has Patient demo good compliance with current HEP and use self massage techniques to reduce tightness. Pt has been issued advanced, long-term HEP. Patient would continue to benefit from skilled PT interventions to continue to improve L LE strength, however pt has limited remaining insurance visits. Patient would attend PT 1x/wk every 2 weeks to save visits. Current objective findings: L knee strength: flex 3+/5, ext  t 4-/5; L hip strength: flex 4+/5, ext and abd 3+/5, mild knee valgus with increase squat depth and 6\" lateral tap down; SLS: firm 30 seconds, foam 15 seconds with increase ankle balance strategy    Goal/Measure of Progress Goal Met? 1. Patient will be independent and compliant with advanced, long-term HEP in prep for D/C to home.    Status at last Eval: n/a Current Status: Long-term HEP established progressing   2. Patient will demonstrate 0-120 deg of L knee AROM to improve mobility for prolonged walking activities. .   Status at last Eval: 1 to 119 deg Current Status: 0-120 deg met   3. Patient to perform 10 repetitions of 6 inch step downs without compensatory strategies to improve ease with obstacle negotiation. Status at last Eval: unable Current Status: Able with good form on 2 inch step progressing     New Goals to be achieved in __4-6__  weeks:  1. Patient will be independent and compliant with advanced, long-term HEP in prep for D/C to home. 2. Patient to perform 10 repetitions of 6 inch step downs without compensatory strategies to improve ease with obstacle negotiation. RECOMMENDATIONS  Patient would continue to benefit from skilled PT interventions for 1x/wk every 2 weeks to continue to increase L LE stability and strength to perform prolonged functional mobility activities and return to PLOF. If you have any questions/comments please contact us directly at [phone]. Thank you for allowing us to assist in the care of your patient. Therapist Signature: JED Valenzuela Date: 6/17/2020    Carlos Patrick, PT Time: 9:33 AM   NOTE TO PHYSICIAN:  PLEASE COMPLETE THE ORDERS BELOW AND FAX TO   [facility] Physical Therapy: [phone]  If you are unable to process this request in 24 hours please contact our office: [phone]    ___ I have read the above report and request that my patient continue as recommended.   ___ I have read the above report and request that my patient continue therapy with the following changes/special instructions:_________________________________________________________   ___ I have read the above report and request that my patient be discharged from therapy.      Physician Signature:        Date:       Time:

## 2020-06-17 NOTE — PROGRESS NOTES
[facility] PHYSICAL THERAPY - DAILY TREATMENT NOTE    Patient Name: June Castillo        Date: 2020  : 1970   yes Patient  Verified  Visit #:     Insurance: Payor: Lizbeth Leavitt / Plan: [place] [employer] / Product Type: PPO /       In time: 2:15 Out time: 3:25   Total Treatment Time: 70     Medicare/[facility] Time Tracking (below)   Total Timed Codes (min):  na 1:1 Treatment Time:  na     TREATMENT AREA =  Left knee pain [M25.562]    SUBJECTIVE  Pain Level (on 0 to 10 scale): 0 / 10   Medication Changes/New allergies or changes in medical history, any new surgeries or procedures?    no  If yes, update Summary List   Subjective Functional Status/Changes:  []  No changes reported     SEE PN       OBJECTIVE    Modalities Rationale:     decrease inflammation, decrease pain and increase muscle contraction/control to improve patient's ability to complete transfers   Min [] Estim, type/location:                                     []  att     []  unatt     []  w/US     []  w/ice    []  w/heat    min []  Mechanical Traction: type/lbs                   []  pro   []  sup   []  int   []  cont    []  before manual    []  after manual    min []  Ultrasound, settings/location:      min []  Iontophoresis w/ dexamethasone, location:                                               []  take home patch       []  in clinic    Min []  Ice     []  Heat    Location/position:    8 Min [x]  Vasopneumatic Device, press/temp: Med/38 deg to L knee in supine    min []  Other:    [x] Skin assessment post-treatment (if applicable):    [x]  intact    [x]  redness- no adverse reaction     []redness  adverse reaction:       60 min Therapeutic Exercis:  [x]  See flow sheet   Rationale:      increase ROM and increase strength to improve the patients ability to perform functional ADLs.      held min Manual Therapy: L knee assessment; DTM/TPR to L quadriceps and hamstrings; L TC and pat sup/inf mobs, L knee PROM per protocol   Rationale: decrease pain, increase ROM, increase tissue extensibility, decrease edema  and decrease trigger points to improve patient's ability to regain L knee PROM>AAROM>AROM. Billed With/As:   [x] TE   [] TA   [] Neuro   [] Self Care Patient Education: [x] Review HEP    [] Progressed/Changed HEP based on:   [] positioning   [] body mechanics   [] transfers   [] heat/ice application    [] other:      Other Objective/Functional Measures:    SEE PN     Post Treatment Pain Level (on 0 to 10) scale:  0 / 10     ASSESSMENT  Assessment/Changes in Function:    SEE PN     []  See Progress Note/Recertification   Patient will continue to benefit from skilled PT services to modify and progress therapeutic interventions, address functional mobility deficits, address ROM deficits, address strength deficits, analyze and address soft tissue restrictions, analyze and cue movement patterns, analyze and modify body mechanics/ergonomics, assess and modify postural abnormalities and instruct in home and community integration to attain remaining goals.    Progress toward goals / Updated goals:    Continue PT 1x/wk every 2 weeks to save insurance visits  SEE PN     PLAN  [x]  Upgrade activities as tolerated yes Continue plan of care   []  Discharge due to :    []  Other:      Therapist: JED Vasquez    Date: 6/17/2020 Time: 3:32 PM     Future Appointments   Date Time Provider Olga Lidia Marie   6/17/2020  2:15 PM Jose Noel

## 2020-10-12 NOTE — PROGRESS NOTES
[facility] THERAPY  [address] [address],[facility], [address] [facility] - Phone: [phone]  Fax: [phone]  DISCHARGE SUMMARY  Patient Name: Warren Bacon : 1970   Treatment/Medical Diagnosis: Left knee pain [M25.562]   Referral Source: Christine Faustin MD     Date of Initial Visit: 20 Attended Visits: 21 Missed Visits: 0     SUMMARY OF TREATMENT  Patient attended 21 PT sessions, including an initial eval, for L knee pain (s/p ACLR 20). Gap in attendance due to nationwide concerns of COVID-19. PT interventions have included manual therapy, therapeutic exercises, patient education, and HEP. [place] electric stimulation with CP to increase muscle contraction/control. CURRENT STATUS  Patient was making steady progress to increase L LE strength and reported no significant pain the last 2 weeks she was attending PT. A progress note was completed on 20 requesting patient to continue PT 1x/every 2 weeks, however patient did not return following this f/u appointment. Therefore a formal reassessment of goals was not performed. Thank you for this referral.     objective findings on 20: L knee strength: flex 3+/5, ext  t 4-/5; L hip strength: flex 4+/5, ext and abd 3+/5, mild knee valgus with increase squat depth and 6\" lateral tap down; SLS: firm 30 seconds, foam 15 seconds with increase ankle balance strategy    Goal/Measure of Progress Goal Met? 1. Patient will be independent and compliant with advanced, long-term HEP in prep for D/C to home. Status at last Eval: Long-term HEP established Current Status: Unable to assess n/a   2. Patient to perform 10 repetitions of 6 inch step downs without compensatory strategies to improve ease with obstacle negotiation.     Status at last Eval: Able with good form on 2 inch step Current Status: Unable to assess n/a     RECOMMENDATIONS  Other: Discontinue therapy due to patient not returning. Thank you for this referral.    If you have any questions/comments please contact us directly at [phone]. Thank you for allowing us to assist in the care of your patient.     Therapist Signature: JED Marcos Date: 10/12/20     Time: 10:06 AM